# Patient Record
Sex: MALE | Race: WHITE | URBAN - METROPOLITAN AREA
[De-identification: names, ages, dates, MRNs, and addresses within clinical notes are randomized per-mention and may not be internally consistent; named-entity substitution may affect disease eponyms.]

---

## 2017-03-07 ENCOUNTER — MEDICAL CORRESPONDENCE (OUTPATIENT)
Dept: HEALTH INFORMATION MANAGEMENT | Facility: CLINIC | Age: 37
End: 2017-03-07

## 2017-03-10 ENCOUNTER — PRE VISIT (OUTPATIENT)
Dept: ORTHOPEDICS | Facility: CLINIC | Age: 37
End: 2017-03-10

## 2017-03-10 NOTE — TELEPHONE ENCOUNTER
Received call from Iris in Pathology @ [facility] - No pathology on file for patient.

## 2017-03-13 NOTE — TELEPHONE ENCOUNTER
Imaging [facility] from [facility]   Forwarded Radiology reports to [facility].     Images Include:   -CR 8/21/15 Knee  -12/8/14 X-ray R knee   -10/5/16 X-ray Knee Right   -8/21/15 Knee   -10/25/12 X-ray knee    -MR R Knee 12/27/16

## 2017-03-15 NOTE — TELEPHONE ENCOUNTER
Records received from [facility].   Included  Office notes: 2/6/17, 12/5/16, 8/10/16, 1/20/16, 11/4/15, 9/15/15    Radiology reports: MRI right knee on 12/7/16   Right knee on 10/5/16, 8/21/15 (left and right)  Op/Procedure notes: right knee arthroscopy on 12/10/15    Missing/needed records: right knee arthroscopy in 2004, has done injections and PT

## 2017-03-16 NOTE — TELEPHONE ENCOUNTER
Spoke with patient on the phone - stated his surgery in 2004 was done by Dr. Wilson Castellanos in [place]. Pt could not recall the name of the clinic/hospital. Stated the records/op notes from 2004 have been destroyed. Said he tried requesting the 2004 op note himself in the past and was told by Dr. Wilson Castellanos's office they only keep records up to 10 yrs.   2004 op note is no longer available.     Saw [facility] between [id number] before he went to the [facility]. Had imaging done.   No other records.     Mailed pt LAURE to sign for [facility].

## 2017-03-23 NOTE — TELEPHONE ENCOUNTER
Records received from [place].   Included  Office notes: 4/22/14, 4/15/14, 5/6/13  ED notes:  Radiology reports: bilateral knee on 4/15/14   MRI right LE joint on 4/15/14   Lower ext hip to ankle on 5/6/13   Right knee on 5/6/13

## 2017-04-13 DIAGNOSIS — M25.561 CHRONIC PAIN OF RIGHT KNEE: Primary | ICD-10-CM

## 2017-04-13 DIAGNOSIS — G89.29 CHRONIC PAIN OF RIGHT KNEE: Primary | ICD-10-CM

## 2017-05-09 ENCOUNTER — OFFICE VISIT (OUTPATIENT)
Dept: ORTHOPEDICS | Facility: CLINIC | Age: 37
End: 2017-05-09

## 2017-05-09 VITALS — WEIGHT: 214.7 LBS | HEIGHT: 68 IN | BODY MASS INDEX: 32.54 KG/M2

## 2017-05-09 DIAGNOSIS — M17.11 PATELLOFEMORAL ARTHRITIS OF RIGHT KNEE: Primary | ICD-10-CM

## 2017-05-09 ASSESSMENT — ACTIVITIES OF DAILY LIVING (ADL): FUNCTION,_DAILY_LIVING_SCORE: 32.35

## 2017-05-09 ASSESSMENT — KOOS JR
HOW SEVERE IS YOUR KNEE STIFFNESS AFTER FIRST WAKING IN MORNING: 1
HOW SEVERE IS YOUR KNEE STIFFNESS AFTER FIRST WAKING IN MORNING: EXTREME

## 2017-05-09 NOTE — LETTER
Verification of Appointment  May 9, 2017     Seen today: yes    Patient:  Matthew Ravi  :   1980  MRN:     [id number]  Physician: SHILPI MCDERMOTT    Matthew Ravi was seen in the [address] [place] [address] [place] [address] on May 9th 2017.                      Electronically signed by Shilpi Mcdermott MD/luis m

## 2017-05-09 NOTE — LETTER
"5/9/2017     RE: Matthew Ravi  [address] [facility] [address]     Dear Colleague,    Thank you for referring your patient, Matthew Ravi, to the [facility] at [facility]. Please see a copy of my visit note below.    CHIEF CONCERN:  Right anterior knee pain.      HISTORY OF PRESENT ILLNESS:  Mr. Ravi is a 36-year-old otherwise healthy male referred to Dr. Goldstein by Dr. Shekhar Lerma at the [facility] for right anterior knee pain for the past 13 years.  He describes an injury in 05/2004 during basic training for the [employer] where he twisted his knee and no injury was identified.  He had no patellofemoral dislocation but vague anterior knee pain symptoms limiting his participation in the .  He was medically discharged thereafter and in 10/2004 underwent arthroscopic debridement in [place].  He cannot recall the details or specific diagnosis at this time.      Following this, in 04/2006, he had another right knee surgery for tendons again for anterior knee pain but he cannot remember a specific procedure performed or the goals of the procedure.  In 09/2012, he had a tibial tubercle osteotomy in [place], [place], again for anterior knee pain symptoms.  He described this surgery as \"failed\" due to progressive knee pain symptoms and the alignment on the subsequent radiographs.      Finally, in 12/2015, he had a diagnostic arthroscopy at the [facility] by Dr. Lerma with a loose body removal.  The note from this procedure revealed lateral facet full-thickness grade III-IV cartilage degeneration with an intact trochlea and no evidence of degenerative disease in the medial or lateral compartments.      In clinic today, Mr. Ravi notes persistent and worsening anterior knee pain symptoms.  He is very frustrated with the 13 years of progressive knee pain symptoms and the lack of a clear diagnosis for his pain.  He has " previously undergone physical therapy near his home between August and 2016, but has not tried taping prior to his appointment today.  He has had a corticosteroid injection in  without significant benefit.  He presents today for further evaluation and discussion of available treatment options.      On a physical therapy visit simultaneous with today's clinic visit, there is noted decrease in anterior knee pain with taping.   Medial glide and tilt from 7/10-5/10 on step-up and step-down.  He has notably poor squat mechanics with the excessive anterior knee excursion, right worse than left.  He has notable core weakness associated with bridge positioning.       PAST MEDICAL HISTORY:  Reviewed with the patient's health intake form and with the patient in the clinic today and notable only for mental health problems which are stable at present.      PAST SURGICAL HISTORY:   None other than those outlined above.      MEDICATIONS:  No home medications.      ALLERGIES:  Erythromycin.      SOCIAL HISTORY:  Mr. 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  He does not smoke and does not drink alcohol.      FAMILY HISTORY:  Reviewed with the patient in the clinic today and negative for bleeding disorders, clotting disorders or difficulty with anesthesia.      PHYSICAL EXAMINATION:  Focused evaluation of right lower extremity reveals a well-healed anterior surgical incision and arthroscopic portals about the right knee.  There is no appreciable intra-articular effusion.  Passive knee range of motion is from full extension without an extensor lag to 115 degrees of knee flexion.  There is crepitation with active knee range of motion in the patellofemoral compartment.  Passive patellar excursion is 1+ quadrants laterally and less than 1 quadrant medially.  His lateral  patellar tilt is to neutral.  There is no J sign, no apprehension.  His hip range of motion is 30 degrees of internal rotation and 50 degrees of external rotation which is symmetric.  With his feet facing anterior, his patella face anteriorly without evidence of femoral anteversion.  There is no clinical evidence of hip flexion contracture bilaterally.      IMAGING:  Standing entire limb alignment radiographs demonstrate the weightbearing axis to fall through the center of the knee on the right.  Outside knee radiographs are reviewed and demonstrate 15 degrees of patellar tilt.  There is no evidence of a supratrochlear bump that C/D index measures 0.65.  Reviewing the outside MRI of the right knee demonstrates full thickness cartilage loss in the lateral patellar facet with intact cartilage of the medial and lateral compartments and trochlear groove.      The TT-TG distance is 13 mm.      ASSESSMENT:   1.  Lateral patellar facet cartilage degeneration.   2.  Lateral retinacular tightness.   3.  Poor squat mechanics and core weakness.      PLAN:  We had an extensive discussion with Mr. Ravi and his wife in clinic today with regards to his right anterior knee pain symptoms.  We discussed that he did demonstrate an improvement with his previous session with physical therapy today emphasizing squat mechanics and taping.  We would encourage them to return for formal physical therapy visit so that he may coordinate with a therapist closer to home to continue to emphasize core strengthening, squat mechanics and taping to concurrently improve his right anterior knee pain symptoms.      We reviewed his plain films and MRI scan which demonstrates a focal degeneration of the lateral patellar facet which clinically correlates with his lateral retinacular tightness on exam.  We discussed that he may benefit from a lateral retinacular release to offload this compartment but the timing is complicated by his wife's upcoming  delivery.      We would encourage them to review the videos of the therapy exercises discussed today and pursue a home exercise program, emphasizing strengthening.  If they return for this at the [facility], they may coordinate this with a formal physical therapy visit at the [facility].      We will forward the results of our evaluation at the [facility], Dr. Lerma so they may consider a lateral retinacular release when appropriate for the patient.  They are encouraged to contact our clinic with any questions, concerns or issues should they arise.      RT: 30 min, CT: 20 min.    I have personally examined this patient and have reviewed the clinical presentation and progress note with the resident.  I agree with the treatment plan as outlined.  The plan was formulated with the resident on the day of the resident dictation.    Shilpi Goldstein    Cc: Shekhar Barrios MD         Dept of orthopedic surgery         [facility], [place], [place].

## 2017-05-09 NOTE — PROGRESS NOTES
"CHIEF CONCERN:  Right anterior knee pain.      HISTORY OF PRESENT ILLNESS:  Mr. Ravi is a 36-year-old otherwise healthy male referred to Dr. Goldstein by Dr. Shekhar Lerma at the [facility] for right anterior knee pain for the past 13 years.  He describes an injury in 05/2004 during basic training for the [employer] where he twisted his knee and no injury was identified.  He had no patellofemoral dislocation but vague anterior knee pain symptoms limiting his participation in the .  He was medically discharged thereafter and in 10/2004 underwent arthroscopic debridement in [place].  He cannot recall the details or specific diagnosis at this time.      Following this, in 04/2006, he had another right knee surgery for tendons again for anterior knee pain but he cannot remember a specific procedure performed or the goals of the procedure.  In 09/2012, he had a tibial tubercle osteotomy in [place], [place], again for anterior knee pain symptoms.  He described this surgery as \"failed\" due to progressive knee pain symptoms and the alignment on the subsequent radiographs.      Finally, in 12/2015, he had a diagnostic arthroscopy at the [facility] by Dr. Lerma with a loose body removal.  The note from this procedure revealed lateral facet full-thickness grade III-IV cartilage degeneration with an intact trochlea and no evidence of degenerative disease in the medial or lateral compartments.      In clinic today, Mr. Ravi notes persistent and worsening anterior knee pain symptoms.  He is very frustrated with the 13 years of progressive knee pain symptoms and the lack of a clear diagnosis for his pain.  He has previously undergone physical therapy near his home between August and September 2016, but has not tried taping prior to his appointment today.  He has had a corticosteroid injection in 2012 without significant benefit.  He presents today for further evaluation and discussion of available " treatment options.      On a physical therapy visit simultaneous with today's clinic visit, there is noted decrease in anterior knee pain with taping.   Medial glide and tilt from 7/10-5/10 on step-up and step-down.  He has notably poor squat mechanics with the excessive anterior knee excursion, right worse than left.  He has notable core weakness associated with bridge positioning.       PAST MEDICAL HISTORY:  Reviewed with the patient's health intake form and with the patient in the clinic today and notable only for mental health problems which are stable at present.      PAST SURGICAL HISTORY:   None other than those outlined above.      MEDICATIONS:  No home medications.      ALLERGIES:  Erythromycin.      SOCIAL HISTORY:  Mr. 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  He does not smoke and does not drink alcohol.      FAMILY HISTORY:  Reviewed with the patient in the clinic today and negative for bleeding disorders, clotting disorders or difficulty with anesthesia.      PHYSICAL EXAMINATION:  Focused evaluation of right lower extremity reveals a well-healed anterior surgical incision and arthroscopic portals about the right knee.  There is no appreciable intra-articular effusion.  Passive knee range of motion is from full extension without an extensor lag to 115 degrees of knee flexion.  There is crepitation with active knee range of motion in the patellofemoral compartment.  Passive patellar excursion is 1+ quadrants laterally and less than 1 quadrant medially.  His lateral patellar tilt is to neutral.  There is no J sign, no apprehension.  His hip range of motion is 30 degrees of internal rotation and 50 degrees of external rotation which is symmetric.  With his feet facing anterior, his patella face anteriorly without evidence of femoral anteversion.  There is no  clinical evidence of hip flexion contracture bilaterally.      IMAGING:  Standing entire limb alignment radiographs demonstrate the weightbearing axis to fall through the center of the knee on the right.  Outside knee radiographs are reviewed and demonstrate 15 degrees of patellar tilt.  There is no evidence of a supratrochlear bump that C/D index measures 0.65.  Reviewing the outside MRI of the right knee demonstrates full thickness cartilage loss in the lateral patellar facet with intact cartilage of the medial and lateral compartments and trochlear groove.      The TT-TG distance is 13 mm.      ASSESSMENT:   1.  Lateral patellar facet cartilage degeneration.   2.  Lateral retinacular tightness.   3.  Poor squat mechanics and core weakness.      PLAN:  We had an extensive discussion with Mr. Ravi and his wife in clinic today with regards to his right anterior knee pain symptoms.  We discussed that he did demonstrate an improvement with his previous session with physical therapy today emphasizing squat mechanics and taping.  We would encourage them to return for formal physical therapy visit so that he may coordinate with a therapist closer to home to continue to emphasize core strengthening, squat mechanics and taping to concurrently improve his right anterior knee pain symptoms.      We reviewed his plain films and MRI scan which demonstrates a focal degeneration of the lateral patellar facet which clinically correlates with his lateral retinacular tightness on exam.  We discussed that he may benefit from a lateral retinacular release to offload this compartment but the timing is complicated by his wife's upcoming delivery.      We would encourage them to review the videos of the therapy exercises discussed today and pursue a home exercise program, emphasizing strengthening.  If they return for this at the [facility], they may coordinate this with a formal physical therapy visit at the [facility]  [place].      We will forward the results of our evaluation at the [facility], Dr. Lerma so they may consider a lateral retinacular release when appropriate for the patient.  They are encouraged to contact our clinic with any questions, concerns or issues should they arise.      RT: 30 min, CT: 20 min.    I have personally examined this patient and have reviewed the clinical presentation and progress note with the resident.  I agree with the treatment plan as outlined.  The plan was formulated with the resident on the day of the resident dictation.    Shilpi Goldstein    Cc: Shekhar Barrios MD         Dept of orthopedic surgery         [place], [place].

## 2017-05-09 NOTE — MR AVS SNAPSHOT
"              After Visit Summary   5/9/2017    Matthew Ravi    MRN: [id number]           Patient Information     Date Of Birth          1980        Visit Information        Provider Department      5/9/2017 8:30 AM Shilpi Goldstein MD [facility]        Today's Diagnoses     Patellofemoral arthritis of right knee    -  1       Follow-ups after your visit        Who to contact     Please call your clinic at [phone] to:    Ask questions about your health    Make or cancel appointments    Discuss your medicines    Learn about your test results    Speak to your doctor   If you have compliments or concerns about an experience at your clinic, or if you wish to file a complaint, please contact [facility] Patient Relations at [phone] or email us at [email or web address]         Additional Information About Your Visit        MyChart Information     [employer] gives you secure access to your electronic health record. If you see a primary care provider, you can also send messages to your care team and make appointments. If you have questions, please call your primary care clinic.  If you do not have a primary care provider, please call [phone] and they will assist you.      Rhapsody is an electronic gateway that provides easy, online access to your medical records. With Rhapsody, you can request a clinic appointment, read your test results, renew a prescription or communicate with your care team.     To access your existing account, please contact your [facility] or call [phone] for assistance.        Care EveryWhere ID     This is your Care EveryWhere ID. This could be used by other organizations to access your [facility] medical records  [id number]        Your Vitals Were     Height BMI (Body Mass Index)                1.727 m (5' 8\") 32.65 kg/m2           Blood Pressure from Last 3 Encounters:   04/07/13 " 139/75   03/08/13 132/80   10/23/12 123/63    Weight from Last 3 Encounters:   05/09/17 97.4 kg (214 lb 11.2 oz)   03/08/13 83.9 kg (184 lb 14.4 oz)   09/10/12 88.5 kg (195 lb)              Today, you had the following     No orders found for display       Primary Care Provider Office Phone # Fax #    Keon Danny Ac [phone]       [facility] [place] [address]  [place] [facility] [address]        Thank you!     Thank you for choosing [facility] ORTHOPAEDIC [facility]  for your care. Our goal is always to provide you with excellent care. Hearing back from our patients is one way we can continue to improve our services. Please take a few minutes to complete the written survey that you may receive in the mail after your visit with us. Thank you!             Your Updated Medication List - Protect others around you: Learn how to safely use, store and throw away your medicines at www.disposemymeds.org.      Notice  As of 5/9/2017 11:59 PM    You have not been prescribed any medications.

## 2017-05-09 NOTE — NURSING NOTE
"Reason For Visit:   Chief Complaint   Patient presents with     Consult     Right Knee cap instability/dislocation       ?  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  Currently working? Yes.  Work status?  Full time.  Date of injury: 05/2004  Type of injury: running  Date of surgery:   Type of surgery: 10/05/2000Left Knee scope, knee cap floating around,    10/2004 Right knee scope, 04/2006 repaired tendons and ligaments, 09/19/2012, tibial release? 2 screws were placed, 12/2015 scope  Smoker: No      Pain Assessment  Patient Currently in Pain: Yes  Patient's Stated Pain Goal: No pain  0-10 Pain Scale: 8  Primary Pain Location: Knee  Pain Orientation: Right  Pain Descriptors: Burning, Discomfort, Sharp, Shooting, Throbbing  Alleviating Factors: Ice, Rest, Other (comment) uses icy hot  Aggravating Factors: Movement, and non movement    Ht 1.727 m (5' 8\")  Wt 97.4 kg (214 lb 11.2 oz)  BMI 32.65 kg/m2     Allergies   Allergen Reactions     Erythromycin      Upset stomach     No current outpatient prescriptions on file.     No current facility-administered medications for this visit.                                                       "

## 2017-06-13 DIAGNOSIS — M17.11 PATELLOFEMORAL ARTHRITIS OF RIGHT KNEE: Primary | ICD-10-CM

## 2017-06-16 ENCOUNTER — TELEPHONE (OUTPATIENT)
Dept: ORTHOPEDICS | Facility: CLINIC | Age: 37
End: 2017-06-16

## 2017-06-16 NOTE — TELEPHONE ENCOUNTER
Writer tried calling patient multiple times this week to go through pre-op education for knee surgery planned for July with Dr. Goldstein.  Patient's phone number does not go through.  Pre-op packet filled out and sent to patient via mail.  This writer will try back in the next week or so regarding pre-op teaching.

## 2017-07-05 NOTE — TELEPHONE ENCOUNTER
Patient called back regarding pre-op instructions.  Patient received pre-op packet in the mail, this was reviewed with patient on the phone.  He understands he will need to have pre-op H&P completed prior to surgery.  He also knows that he should set up PT to start within one week of surgery.  He has our phone number for any further questions or concerns.

## 2017-07-05 NOTE — TELEPHONE ENCOUNTER
Left message for patient to call back to discuss pre-op teaching for knee surgery planned on 7/28/2017.

## 2017-07-26 RX ORDER — MECLIZINE HCL 25MG 25 MG/1
25 TABLET, CHEWABLE ORAL 2 TIMES DAILY PRN
COMMUNITY

## 2017-07-26 RX ORDER — TRIAMCINOLONE ACETONIDE 1 MG/G
CREAM TOPICAL 2 TIMES DAILY PRN
COMMUNITY

## 2017-07-26 RX ORDER — FLUTICASONE PROPIONATE 50 MCG
2 SPRAY, SUSPENSION (ML) NASAL DAILY
COMMUNITY

## 2017-07-26 RX ORDER — FLUOCINONIDE 0.5 MG/G
CREAM TOPICAL 2 TIMES DAILY
COMMUNITY

## 2017-07-27 ENCOUNTER — ANESTHESIA EVENT (OUTPATIENT)
Dept: SURGERY | Facility: AMBULATORY SURGERY CENTER | Age: 37
End: 2017-07-27

## 2017-07-28 ENCOUNTER — ANESTHESIA (OUTPATIENT)
Dept: SURGERY | Facility: AMBULATORY SURGERY CENTER | Age: 37
End: 2017-07-28

## 2017-07-28 ENCOUNTER — HOSPITAL ENCOUNTER (OUTPATIENT)
Facility: AMBULATORY SURGERY CENTER | Age: 37
End: 2017-07-28
Attending: ORTHOPAEDIC SURGERY

## 2017-07-28 ENCOUNTER — SURGERY (OUTPATIENT)
Age: 37
End: 2017-07-28

## 2017-07-28 VITALS
TEMPERATURE: 97 F | SYSTOLIC BLOOD PRESSURE: 117 MMHG | OXYGEN SATURATION: 96 % | RESPIRATION RATE: 16 BRPM | DIASTOLIC BLOOD PRESSURE: 77 MMHG

## 2017-07-28 DIAGNOSIS — M25.369 PATELLOFEMORAL INSTABILITY WITH PAIN, UNSPECIFIED LATERALITY: Primary | ICD-10-CM

## 2017-07-28 DIAGNOSIS — M25.569 PATELLOFEMORAL INSTABILITY WITH PAIN, UNSPECIFIED LATERALITY: Primary | ICD-10-CM

## 2017-07-28 RX ORDER — MEPERIDINE HYDROCHLORIDE 25 MG/ML
12.5 INJECTION INTRAMUSCULAR; INTRAVENOUS; SUBCUTANEOUS
Status: DISCONTINUED | OUTPATIENT
Start: 2017-07-28 | End: 2017-07-29 | Stop reason: HOSPADM

## 2017-07-28 RX ORDER — FENTANYL CITRATE 50 UG/ML
25-50 INJECTION, SOLUTION INTRAMUSCULAR; INTRAVENOUS
Status: DISCONTINUED | OUTPATIENT
Start: 2017-07-28 | End: 2017-07-28 | Stop reason: HOSPADM

## 2017-07-28 RX ORDER — KETOROLAC TROMETHAMINE 30 MG/ML
INJECTION, SOLUTION INTRAMUSCULAR; INTRAVENOUS PRN
Status: DISCONTINUED | OUTPATIENT
Start: 2017-07-28 | End: 2017-07-28

## 2017-07-28 RX ORDER — DEXAMETHASONE SODIUM PHOSPHATE 4 MG/ML
INJECTION, SOLUTION INTRA-ARTICULAR; INTRALESIONAL; INTRAMUSCULAR; INTRAVENOUS; SOFT TISSUE PRN
Status: DISCONTINUED | OUTPATIENT
Start: 2017-07-28 | End: 2017-07-28

## 2017-07-28 RX ORDER — OXYCODONE HYDROCHLORIDE 5 MG/1
10 TABLET ORAL ONCE
Status: COMPLETED | OUTPATIENT
Start: 2017-07-28 | End: 2017-07-28

## 2017-07-28 RX ORDER — SODIUM CHLORIDE, SODIUM LACTATE, POTASSIUM CHLORIDE, CALCIUM CHLORIDE 600; 310; 30; 20 MG/100ML; MG/100ML; MG/100ML; MG/100ML
INJECTION, SOLUTION INTRAVENOUS CONTINUOUS
Status: DISCONTINUED | OUTPATIENT
Start: 2017-07-28 | End: 2017-07-29 | Stop reason: HOSPADM

## 2017-07-28 RX ORDER — ONDANSETRON 4 MG/1
4 TABLET, ORALLY DISINTEGRATING ORAL EVERY 30 MIN PRN
Status: DISCONTINUED | OUTPATIENT
Start: 2017-07-28 | End: 2017-07-29 | Stop reason: HOSPADM

## 2017-07-28 RX ORDER — ACETAMINOPHEN 325 MG/1
975 TABLET ORAL ONCE
Status: COMPLETED | OUTPATIENT
Start: 2017-07-28 | End: 2017-07-28

## 2017-07-28 RX ORDER — NALOXONE HYDROCHLORIDE 0.4 MG/ML
.1-.4 INJECTION, SOLUTION INTRAMUSCULAR; INTRAVENOUS; SUBCUTANEOUS
Status: DISCONTINUED | OUTPATIENT
Start: 2017-07-28 | End: 2017-07-29 | Stop reason: HOSPADM

## 2017-07-28 RX ORDER — ACETAMINOPHEN 325 MG/1
650 TABLET ORAL EVERY 4 HOURS PRN
Qty: 100 TABLET | Refills: 0 | Status: SHIPPED | OUTPATIENT
Start: 2017-07-28

## 2017-07-28 RX ORDER — LIDOCAINE HYDROCHLORIDE 20 MG/ML
INJECTION, SOLUTION INFILTRATION; PERINEURAL PRN
Status: DISCONTINUED | OUTPATIENT
Start: 2017-07-28 | End: 2017-07-28

## 2017-07-28 RX ORDER — AMOXICILLIN 250 MG
1-2 CAPSULE ORAL 2 TIMES DAILY
Qty: 30 TABLET | Refills: 0 | Status: SHIPPED | OUTPATIENT
Start: 2017-07-28 | End: 2017-08-11

## 2017-07-28 RX ORDER — PROPOFOL 10 MG/ML
INJECTION, EMULSION INTRAVENOUS CONTINUOUS PRN
Status: DISCONTINUED | OUTPATIENT
Start: 2017-07-28 | End: 2017-07-28

## 2017-07-28 RX ORDER — KETOROLAC TROMETHAMINE 30 MG/ML
30 INJECTION, SOLUTION INTRAMUSCULAR; INTRAVENOUS EVERY 6 HOURS PRN
Status: DISCONTINUED | OUTPATIENT
Start: 2017-07-28 | End: 2017-07-29 | Stop reason: HOSPADM

## 2017-07-28 RX ORDER — SODIUM CHLORIDE, SODIUM LACTATE, POTASSIUM CHLORIDE, CALCIUM CHLORIDE 600; 310; 30; 20 MG/100ML; MG/100ML; MG/100ML; MG/100ML
INJECTION, SOLUTION INTRAVENOUS CONTINUOUS PRN
Status: DISCONTINUED | OUTPATIENT
Start: 2017-07-28 | End: 2017-07-28

## 2017-07-28 RX ORDER — PROPOFOL 10 MG/ML
INJECTION, EMULSION INTRAVENOUS PRN
Status: DISCONTINUED | OUTPATIENT
Start: 2017-07-28 | End: 2017-07-28

## 2017-07-28 RX ORDER — FENTANYL CITRATE 50 UG/ML
INJECTION, SOLUTION INTRAMUSCULAR; INTRAVENOUS PRN
Status: DISCONTINUED | OUTPATIENT
Start: 2017-07-28 | End: 2017-07-28

## 2017-07-28 RX ORDER — GABAPENTIN 300 MG/1
300 CAPSULE ORAL ONCE
Status: COMPLETED | OUTPATIENT
Start: 2017-07-28 | End: 2017-07-28

## 2017-07-28 RX ORDER — OXYCODONE HYDROCHLORIDE 5 MG/1
5-10 TABLET ORAL
Qty: 30 TABLET | Refills: 0 | Status: SHIPPED | OUTPATIENT
Start: 2017-07-28 | End: 2017-08-11

## 2017-07-28 RX ORDER — ONDANSETRON 2 MG/ML
INJECTION INTRAMUSCULAR; INTRAVENOUS PRN
Status: DISCONTINUED | OUTPATIENT
Start: 2017-07-28 | End: 2017-07-28

## 2017-07-28 RX ORDER — CEFAZOLIN SODIUM 1 G/3ML
1 INJECTION, POWDER, FOR SOLUTION INTRAMUSCULAR; INTRAVENOUS SEE ADMIN INSTRUCTIONS
Status: DISCONTINUED | OUTPATIENT
Start: 2017-07-28 | End: 2017-07-28 | Stop reason: HOSPADM

## 2017-07-28 RX ORDER — ONDANSETRON 2 MG/ML
4 INJECTION INTRAMUSCULAR; INTRAVENOUS EVERY 30 MIN PRN
Status: DISCONTINUED | OUTPATIENT
Start: 2017-07-28 | End: 2017-07-29 | Stop reason: HOSPADM

## 2017-07-28 RX ADMIN — ACETAMINOPHEN 975 MG: 325 TABLET ORAL at 12:30

## 2017-07-28 RX ADMIN — DEXAMETHASONE SODIUM PHOSPHATE 4 MG: 4 INJECTION, SOLUTION INTRA-ARTICULAR; INTRALESIONAL; INTRAMUSCULAR; INTRAVENOUS; SOFT TISSUE at 14:10

## 2017-07-28 RX ADMIN — FENTANYL CITRATE 50 MCG: 50 INJECTION, SOLUTION INTRAMUSCULAR; INTRAVENOUS at 13:56

## 2017-07-28 RX ADMIN — PROPOFOL 200 MCG/KG/MIN: 10 INJECTION, EMULSION INTRAVENOUS at 13:58

## 2017-07-28 RX ADMIN — KETOROLAC TROMETHAMINE 30 MG: 30 INJECTION, SOLUTION INTRAMUSCULAR; INTRAVENOUS at 15:04

## 2017-07-28 RX ADMIN — SODIUM CHLORIDE, SODIUM LACTATE, POTASSIUM CHLORIDE, CALCIUM CHLORIDE: 600; 310; 30; 20 INJECTION, SOLUTION INTRAVENOUS at 13:47

## 2017-07-28 RX ADMIN — PROPOFOL 200 MG: 10 INJECTION, EMULSION INTRAVENOUS at 13:56

## 2017-07-28 RX ADMIN — LIDOCAINE HYDROCHLORIDE 100 MG: 20 INJECTION, SOLUTION INFILTRATION; PERINEURAL at 13:56

## 2017-07-28 RX ADMIN — GABAPENTIN 300 MG: 300 CAPSULE ORAL at 12:30

## 2017-07-28 RX ADMIN — OXYCODONE HYDROCHLORIDE 10 MG: 5 TABLET ORAL at 16:02

## 2017-07-28 RX ADMIN — ONDANSETRON 4 MG: 2 INJECTION INTRAMUSCULAR; INTRAVENOUS at 14:10

## 2017-07-28 RX ADMIN — PROPOFOL: 10 INJECTION, EMULSION INTRAVENOUS at 14:17

## 2017-07-28 NOTE — BRIEF OP NOTE
Orthopaedic Surgery Brief Op-Note     Patient: Matthew Raiv  : 1980  Date of Service: 2017 3:28 PM    Preoperative Diagnosis: Patellofemoral Arthritis     Postoperative Diagnosis: same    Procedure: Procedure(s):  Right Knee Arthroscopy Plus Lateral Retinacular Lengthening and Anterior Chamber Release - Wound Class: I-Clean    Surgeon: Dr. Goldstein    Assistants:  Heather Navarro MD    Anesthesia: General     Estimated Blood Loss: 30 cc        Specimen: none       Complications: none    Condition: stable    Findings: please see dictated operative note    Plan:    WBAT with hinged knee brace on and locked at 10 degrees; assistive devices as needed    Hinged knee brace is to be set at 10 degrees to 90 degrees; lock at 10 degrees flexion while ambulating; the brace is to be worn at all times except with range of motion exercises and CPM use    CPM to be set at 0 degrees to flexion tolerance with goal of 90 degrees; advance aggressively in 5 degree increments as tolerated by the patient, emphasize full extension about the knee    PT as outpatient; an order and PT protocol will be provided to the patient at time of discharge    ADAT    Pain control with orals prn    Bowel prophylaxis with senna-docusate    DVT prophylaxis: mechanical only     Future Appointments  Date Time Provider Department Center   2017 11:15 AM Shilpi Goldstein MD [facility]       Disposition: patient may be discharged with  once appropriate discharge criteria have been met    Heather Navarro MD  PGY-2  Orthopaedic Surgery   [phone]

## 2017-07-28 NOTE — LETTER
Matthew Ravi  [address] [facility] [address]      July 28, 2017    To Whom It May Concern:     Sarahi Richards underwent surgery by me at the [facility] and [facility].  I would ask for your help in taking care of his/her wound postop.       The surgical procedure was: Lateral retinacular lengthening, anterior chamber release and arthroscopy of  Right knee     The incisions were closed with a buried absorbable running stitch,Dermabond Skin Closure System  was applied overtop, and then finally covered with steri-strips. There is a single suture in a portal site that is not buried.      The Dermabond is a strong adhesive that allows for a water-tight wound closer. Typically, the Dermabond remains in place until postoperative day 14, and will slowly dissolve over time. The steri-strips were placed over the Dermabond for reinforcement.      If you are checking the wound before postoperative day 14, trim any free edges of the Dermabond that are no longer adhered to the skin surface. To do this, grasp the free edge, apply gentle tension, and trim with a scissors. Inform the patient that the remaining Dermabond will eventually come off of the skin surface with bathing. Allow the steri-strips to fall-off on their own. Once they have fallen off, they no longer need to be replaced so long as the incision remains clean, dry, and intact. Between day 10-14 the single non-buried suture in the portal site will need to be removed. The ends of the buried absorbable running stitch can also be trimmed at the skin at this time time.      Once you have performed the general incision cares outlined above, please apply a fresh dressing to the wound using 4x4s and tape.      Should there be any concerns with the incision, please feel free to call our triage line at [phone], option #3, and ask to speak with one of the triage nurses.       Thanks in advance in helping me out in the postoperative  care of this patient.       Sincerely,

## 2017-07-28 NOTE — DISCHARGE INSTRUCTIONS
[facility]  Home Care Following Anesthesia  For 24 hours after surgery:  1. Get plenty of rest.  A responsible adult must stay with you for at least 24 hours after you leave the surgery center.  2. Do not drive or use heavy equipment.  If you have weakness or tingling, don't drive or use heavy equipment until this feeling goes away.   3. Do not drink alcohol.   4. Avoid strenuous or risky activities.  Ask for help when climbing stairs.  5. You may feel lightheaded.  IF so, sit for a few minutes before standing.  Have someone help you get up.   6. If you have nausea (feel sick to your stomach): Drink only clear liquids such as apple juice, ginger ale, broth or 7-Up.  Rest may also help.  Be sure to drink enough fluids.  Move to a regular diet as you feel able.   7. You may have a slight fever.  Call the doctor if your fever is over 100 F (37.7 C) (taken under the tongue) or lasts longer than 24 hours.  8. You may have a dry mouth, a sore throat, muscle aches or trouble sleeping. These should go away after 24 hours.  9. Do not make important or legal decisions.     Tips for taking pain medications  To get the best pain relief possible, remember these points:    Take pain medications as directed, before pain becomes severe.    Pain medication can upset your stomach: taking it with food may help.    Constipation is a common side effect of pain medication. Drink plenty of  fluids.    Eat foods high in fiber. Take a stool softener if recommended by your doctor or pharmacist.    Do not drink alcohol, drive or operate machinery while taking pain medications.    Ask about other ways to control pain, such as with heat, ice or relaxation.    Tylenol/Acetaminophen Consumption  To help encourage the safe use of acetaminophen, the makers of TYLENOL  have lowered the maximum daily dose for single-ingredient Extra Strength TYLENOL  (acetaminophen) products sold in the [place] from 8 pills per day  (4,000 mg) to 6 pills per day (3,000 mg). The dosing interval has also changed from 2 pills every 4-6 hours to 2 pills every 6 hours.    If you feel your pain relief is insufficient, you may take Tylenol/Acetaminophen in addition to your narcotic pain medication.     Be careful not to exceed 3,000 mg of Tylenol/Acetaminophen in a 24 hour period from all sources.    If you are taking extra strength Tylenol/acetaminophen (500 mg), the maximum dose is 6 tablets in 24 hours.    If you are taking regular strength acetaminophen (325 mg), the maximum dose is 9 tablets in 24 hours.    Call a doctor for any of the followin. Signs of infection (fever, growing tenderness at the surgery site, a large amount of drainage or bleeding, severe pain, foul-smelling drainage, redness, swelling).  2. It has been over 8 to 10 hours since surgery and you are still not able to urinate (pass water).  3. Headache for over 24 hours.    Your doctor is:  Dr. Shilpi Goldstein, Orthopaedics: [phone]           Or dial [phone] and ask for the resident on call for:  Orthopaedics  For emergency care, call the:  [facility] Emergency Department: [phone] (TTY for hearing impaired: [phone])

## 2017-07-28 NOTE — ANESTHESIA CARE TRANSFER NOTE
Patient: Matthew Ravi    Procedure(s):  Right Knee Arthroscopy Plus Lateral Retinacular Lengthening and Anterior Chamber Release - Wound Class: I-Clean    Diagnosis: Patellofemoral Arthritis  Diagnosis Additional Information: No value filed.    Anesthesia Type:   General, LMA     Note:  Airway :Face Mask  Patient transferred to:PACU  Comments: Arrive PACU, Stable, Airway Intact  105/91, 85,12,93%  All questions answered.        Vitals: (Last set prior to Anesthesia Care Transfer)    CRNA VITALS  7/28/2017 1457 - 7/28/2017 1531      7/28/2017             Pulse: 74    SpO2: 98 %    Resp Rate (observed): 12                Electronically Signed By: JUS Power CRNA  July 28, 2017  3:31 PM

## 2017-07-28 NOTE — IP AVS SNAPSHOT
MRN:[id number]                      After Visit Summary   7/28/2017    Matthew Ravi    MRN: [id number]           Thank you!     Thank you for choosing [facility] for your care. Our goal is always to provide you with excellent care. Hearing back from our patients is one way we can continue to improve our services. Please take a few minutes to complete the written survey that you may receive in the mail after you visit with us. Thank you!        Patient Information     Date Of Birth          1980        About your hospital stay     You were admitted on:  July 28, 2017 [facility]    You were discharged on:  July 28, 2017       Who to Call     For medical emergencies, please call 911.  For non-urgent questions about your medical care, please call your primary care provider or clinic, [phone]  For questions related to your surgery, please call your surgery clinic        Attending Provider     Provider Shilpi Euceda MD Orthopedics       Primary Care Provider Office Phone # Fax #    Keon Danny Ac [phone]      After Care Instructions     Discharge Instructions       KNEE ARTHROSCOPY POST OPERATIVE INSTRUCTIONS    A.  COMFORT:  -Elevation: Elevate your knee and ankle above the level of your heart. The best position is lying down with two pillows lengthwise under your entire leg. This should be done for the first several days after surgery.  -Swelling: An ice pack will be provided to control swelling and discomfort.  Once the initial dressing is removed, place a thin towel between your skin and the ice pack.  -Pain Medication: Take medication as prescribed, but only as often as necessary. Avoid alcohol and driving if you are taking pain medication.  Remember that Tylenol can be used for pain relief as you wean off the narcotics. The maximum Tylenol dose for a single day is 3000 mg.           B.  ACTIVITIES:  -Exercises: Point and flex your feet and wiggle your toes, move your ankles clockwise and counterclockwise in circular motions, to help prevent complications such as blood clotting in your legs. Thigh muscle tightening exercises should begin the day of surgery - Tighten quadriceps muscles on top of thigh so the kneecap is pulled up and leg is completely straight. Hold to a count of five. Relax 1-2 seconds between each exercise. Do ten times each hour.  -Weight bearing status: Use crutches as needed for the first two to three days to avoid pain when walking. You may weight bear on your leg as tolerated.  WBAT with hinged knee brace on and locked at 10 degrees; assistive devices as needed  Hinged knee brace is to be set at 10 degrees to 90 degrees; lock at 10 degrees flexion while ambulating; the brace is to be worn at all times except with range of motion exercises and CPM use  CPM to be set at 0 degrees to flexion tolerance with goal of 90 degrees; advance aggressively in 5 degree increments as tolerated by the patient, emphasize full extension about the knee    C. INCISION CARE:    -Keep the initial post-op dressing on, clean and dry for 3 days after surgery. You may then remove the dressing and shower. If the dressing feels too tight or you are experiencing numbness swelling, or tingling below the dressing, remove the outer dressing.  -The steri-strips (small white tape that is directly on the incision areas) should be left on until they fall off or are removed at your first office visit. If the wound is not healing, new steri-strips should be applied. If a little drainage from the wound occurs after you have removed the original dressing, use a band-aid. If the drainage increases, CALL YOUR DOCTOR.  Redress the knee with tubi- or ace wrap as long as swelling persists.   -Avoid soaking the wound in a pool or bath for TEN days (or until the skin has healed over the surgical area). When  taking a shower, let water run over the wound, pat dry. DO NOT rub or scrub the wound area. The wound itself can be cleaned with rubbing alcohol or peroxide. You may also use rubbing alcohol to remove betadine or other skin prep solution from the leg.    D. CALL YOUR PHYSICIAN IF:  -Pain in your knee persists or worsens in the first few days after surgery, or is not relieved by Tylenol or the narcotic prescribed to you.  -You have excessive redness or drainage of cloudy or bloody material from the wounds (Clear red tinted fluid and some mild drainage should be expected). Drainage of any kind 5 days after surgery should be reported to the doctor.  -You have signs of infection such as a temperature elevation greater than 101.5 , increased tenderness, swelling or redness at surgical site.  -You have pain, swelling or redness in your calf that increases over time.  -You have numbness or weakness in your leg or foot.  -You injure your leg or knee.      You may contact your physician at [phone] during business hours.    For after hours or weekend calls, you may contact the hospital at [phone] and ask for the on-call orthopedic resident            Ice to affected area       Ice pack to surgical site every 15 minutes per hour for 24 hours            No Alcohol       While on narcotic medication            No driving or operating machinery       Do not drive or operate machinery until narcotic pain medications are discontinued            Shower       You may remove the dressing and shower on POD #3            Weight bearing - As tolerated       WBAT with hinged knee brace on and locked at 10 degrees; assistive devices as needed  Hinged knee brace is to be set at 10 degrees to 90 degrees; lock at 10 degrees flexion while ambulating; the brace is to be worn at all times except with range of motion exercises and CPM use  CPM to be set at 0 degrees to flexion tolerance with goal of 90 degrees; advance aggressively  in 5 degree increments as tolerated by the patient, emphasize full extension about the knee                  Your next 10 appointments already scheduled     Aug 29, 2017 11:15 AM CDT   (Arrive by 11:00 AM)   RETURN KNEE with Shilpi Goldstein MD   [facility] ([facility])    [address] [place] [address] [phone]              Further instructions from your care team       [facility]  Home Care Following Anesthesia  For 24 hours after surgery:  1. Get plenty of rest.  A responsible adult must stay with you for at least 24 hours after you leave the surgery center.  2. Do not drive or use heavy equipment.  If you have weakness or tingling, don't drive or use heavy equipment until this feeling goes away.   3. Do not drink alcohol.   4. Avoid strenuous or risky activities.  Ask for help when climbing stairs.  5. You may feel lightheaded.  IF so, sit for a few minutes before standing.  Have someone help you get up.   6. If you have nausea (feel sick to your stomach): Drink only clear liquids such as apple juice, ginger ale, broth or 7-Up.  Rest may also help.  Be sure to drink enough fluids.  Move to a regular diet as you feel able.   7. You may have a slight fever.  Call the doctor if your fever is over 100 F (37.7 C) (taken under the tongue) or lasts longer than 24 hours.  8. You may have a dry mouth, a sore throat, muscle aches or trouble sleeping. These should go away after 24 hours.  9. Do not make important or legal decisions.     Tips for taking pain medications  To get the best pain relief possible, remember these points:    Take pain medications as directed, before pain becomes severe.    Pain medication can upset your stomach: taking it with food may help.    Constipation is a common side effect of pain medication. Drink plenty of  fluids.    Eat foods high in fiber. Take a stool softener if  recommended by your doctor or pharmacist.    Do not drink alcohol, drive or operate machinery while taking pain medications.    Ask about other ways to control pain, such as with heat, ice or relaxation.    Tylenol/Acetaminophen Consumption  To help encourage the safe use of acetaminophen, the makers of TYLENOL  have lowered the maximum daily dose for single-ingredient Extra Strength TYLENOL  (acetaminophen) products sold in the [place] from 8 pills per day (4,000 mg) to 6 pills per day (3,000 mg). The dosing interval has also changed from 2 pills every 4-6 hours to 2 pills every 6 hours.    If you feel your pain relief is insufficient, you may take Tylenol/Acetaminophen in addition to your narcotic pain medication.     Be careful not to exceed 3,000 mg of Tylenol/Acetaminophen in a 24 hour period from all sources.    If you are taking extra strength Tylenol/acetaminophen (500 mg), the maximum dose is 6 tablets in 24 hours.    If you are taking regular strength acetaminophen (325 mg), the maximum dose is 9 tablets in 24 hours.    Call a doctor for any of the followin. Signs of infection (fever, growing tenderness at the surgery site, a large amount of drainage or bleeding, severe pain, foul-smelling drainage, redness, swelling).  2. It has been over 8 to 10 hours since surgery and you are still not able to urinate (pass water).  3. Headache for over 24 hours.    Your doctor is:  Dr. Shilpi Goldstein, Orthopaedics: [phone]           Or dial [phone] and ask for the resident on call for:  Orthopaedics  For emergency care, call the:  [facility] Emergency Department: [phone] (TTY for hearing impaired: [phone])                Pending Results     No orders found from 2017 to 2017.            Admission Information     Date & Time Provider Department Dept. Phone    2017 Shilpi Goldstein MD [facility] [phone]      Your Vitals Were     Blood Pressure  Temperature Respirations Pulse Oximetry          142/78 97  F (36.1  C) (Temporal) 20 97%        [employer] Information     [employer] gives you secure access to your electronic health record. If you see a primary care provider, you can also send messages to your care team and make appointments. If you have questions, please call your primary care clinic.  If you do not have a primary care provider, please call [phone] and they will assist you.      [employer] is an electronic gateway that provides easy, online access to your medical records. With [employer], you can request a clinic appointment, read your test results, renew a prescription or communicate with your care team.     To access your existing account, please contact your [facility] or call [phone] for assistance.        Care EveryWhere ID     This is your Care EveryWhere ID. This could be used by other organizations to access your [facility] medical records  [id number]        Equal Access to Services     ADEN DAVIS : Charla Nichols, ny ivey, bina chua, allen barney . So [facility] [phone].    ATENCIÓN: Si habla español, tiene a aguiar disposición servicios gratuitos de asistencia lingüística. Llivonne al [phone].    We comply with applicable federal civil rights laws and [place] laws. We do not discriminate on the basis of race, color, national origin, age, disability sex, sexual orientation or gender identity.               Review of your medicines      START taking        Dose / Directions    acetaminophen 325 MG tablet   Commonly known as:  TYLENOL   Used for:  Patellofemoral instability with pain, unspecified laterality        Dose:  650 mg   Take 2 tablets (650 mg) by mouth every 4 hours as needed for other (mild pain)   Quantity:  100 tablet   Refills:  0       oxyCODONE 5 MG IR tablet   Commonly known as:  ROXICODONE   Used for:  Patellofemoral  instability with pain, unspecified laterality        Dose:  5-10 mg   Take 1-2 tablets (5-10 mg) by mouth every 3 hours as needed for pain or other (Moderate to Severe)   Quantity:  30 tablet   Refills:  0       senna-docusate 8.6-50 MG per tablet   Commonly known as:  SENOKOT-S;PERICOLACE   Used for:  Patellofemoral instability with pain, unspecified laterality        Dose:  1-2 tablet   Take 1-2 tablets by mouth 2 times daily Take while on oral narcotics to prevent or treat constipation.   Quantity:  30 tablet   Refills:  0         CONTINUE these medicines which have NOT CHANGED        Dose / Directions    ATORVASTATIN CALCIUM PO        Dose:  10 mg   Take 10 mg by mouth every morning   Refills:  0       camphor-menthol 0.5-0.5 % Lotn   Commonly known as:  DERMASARRA        Apply topically daily as needed for skin care   Refills:  0       FEXOFENADINE HCL PO        Dose:  60 mg   Take 60 mg by mouth daily as needed for allergies   Refills:  0       fluocinonide 0.05 % cream   Commonly known as:  LIDEX        Apply topically 2 times daily   Refills:  0       fluticasone 50 MCG/ACT spray   Commonly known as:  FLONASE        Dose:  2 spray   Spray 2 sprays into both nostrils daily   Refills:  0       HYDROXYZINE PAMOATE PO        Dose:  25 mg   Take 25 mg by mouth every 6 hours as needed for itching   Refills:  0       meclizine 25 MG Chew        Dose:  25 mg   Take 25 mg by mouth 2 times daily as needed for dizziness (1/2 tablet)   Refills:  0       triamcinolone 0.1 % cream   Commonly known as:  KENALOG        Apply topically 2 times daily as needed for irritation   Refills:  0            Where to get your medicines      These medications were sent to [place], [place] - [address]  [address] [place] [address] [address], [facility] [address]    Hours:  TRANSPLANT PHONE NUMBER [phone] Phone:  [phone]     acetaminophen 325 MG tablet    senna-docusate 8.6-50 MG per  tablet         Some of these will need a paper prescription and others can be bought over the counter. Ask your nurse if you have questions.     Bring a paper prescription for each of these medications     oxyCODONE 5 MG IR tablet                Protect others around you: Learn how to safely use, store and throw away your medicines at www.disposemymeds.org.             Medication List: This is a list of all your medications and when to take them. Check marks below indicate your daily home schedule. Keep this list as a reference.      Medications           Morning Afternoon Evening Bedtime As Needed    acetaminophen 325 MG tablet   Commonly known as:  TYLENOL   Take 2 tablets (650 mg) by mouth every 4 hours as needed for other (mild pain)   Last time this was given:  975 mg on 7/28/2017 12:30 PM                                ATORVASTATIN CALCIUM PO   Take 10 mg by mouth every morning                                camphor-menthol 0.5-0.5 % Lotn   Commonly known as:  DERMASARRA   Apply topically daily as needed for skin care                                FEXOFENADINE HCL PO   Take 60 mg by mouth daily as needed for allergies                                fluocinonide 0.05 % cream   Commonly known as:  LIDEX   Apply topically 2 times daily                                fluticasone 50 MCG/ACT spray   Commonly known as:  FLONASE   Spray 2 sprays into both nostrils daily                                HYDROXYZINE PAMOATE PO   Take 25 mg by mouth every 6 hours as needed for itching                                meclizine 25 MG Chew   Take 25 mg by mouth 2 times daily as needed for dizziness (1/2 tablet)                                oxyCODONE 5 MG IR tablet   Commonly known as:  ROXICODONE   Take 1-2 tablets (5-10 mg) by mouth every 3 hours as needed for pain or other (Moderate to Severe)                                senna-docusate 8.6-50 MG per tablet   Commonly known as:  SENOKOT-S;PERICOLACE   Take 1-2 tablets  by mouth 2 times daily Take while on oral narcotics to prevent or treat constipation.                                triamcinolone 0.1 % cream   Commonly known as:  KENALOG   Apply topically 2 times daily as needed for irritation

## 2017-07-28 NOTE — ANESTHESIA PREPROCEDURE EVALUATION
Anesthesia Evaluation     . Pt has had prior anesthetic. Type: General    No history of anesthetic complications          ROS/MED HX    ENT/Pulmonary:  - neg pulmonary ROS     Neurologic:  - neg neurologic ROS     Cardiovascular:  - neg cardiovascular ROS       METS/Exercise Tolerance:  >4 METS   Hematologic:  - neg hematologic  ROS       Musculoskeletal:  - neg musculoskeletal ROS       GI/Hepatic:  - neg GI/hepatic ROS       Renal/Genitourinary:  - ROS Renal section negative       Endo:  - neg endo ROS       Psychiatric:  - neg psychiatric ROS       Infectious Disease:  - neg infectious disease ROS       Malignancy:         Other:                     Physical Exam  Normal systems: dental    Airway   Mallampati: I  TM distance: >3 FB  Neck ROM: full    Dental     Cardiovascular   Rhythm and rate: regular and normal      Pulmonary    breath sounds clear to auscultation                    Anesthesia Plan      History & Physical Review  History and physical reviewed and following examination; no interval change.    ASA Status:  1 .    NPO Status:  > 6 hours    Plan for General and LMA with Intravenous induction. Maintenance will be TIVA.    PONV prophylaxis:  Ondansetron (or other 5HT-3) and Dexamethasone or Solumedrol       Postoperative Care  Postoperative pain management:  Oral pain medications and Multi-modal analgesia.      Consents  Anesthetic plan, risks, benefits and alternatives discussed with:  Patient..                          .

## 2017-07-28 NOTE — IP AVS SNAPSHOT
[facility]    [address] [place] [id number]    Phone:  [phone]    Fax:  [phone]                                       After Visit Summary   7/28/2017    Matthew Ravi    MRN: [id number]           After Visit Summary Signature Page     I have received my discharge instructions, and my questions have been answered. I have discussed any challenges I see with this plan with the nurse or doctor.    ..........................................................................................................................................  Patient/Patient Representative Signature      ..........................................................................................................................................  Patient Representative Print Name and Relationship to Patient    ..................................................               ................................................  Date                                            Time    ..........................................................................................................................................  Reviewed by Signature/Title    ...................................................              ..............................................  Date                                                            Time

## 2017-07-28 NOTE — LETTER
Matthew Ravi  [address] [place] [address]           Matthew Ravi  [address] [place] [address]      July 28, 2017    To Whom It May Concern:     Matthew Ravi underwent surgery by me at the [facility].  I would ask for your help in taking care of his/her wound postop.       The surgical procedure was: Lateral retinacular lengthening, anterior chamber release and arthroscopy of  Right knee     The incisions were closed with a buried absorbable running stitch,Dermabond Skin Closure System  was applied overtop, and then finally covered with steri-strips. There is a single suture in a portal site that is not buried.      The Dermabond is a strong adhesive that allows for a water-tight wound closer. Typically, the Dermabond remains in place until postoperative day 14, and will slowly dissolve over time. The steri-strips were placed over the Dermabond for reinforcement.      If you are checking the wound before postoperative day 14, trim any free edges of the Dermabond that are no longer adhered to the skin surface. To do this, grasp the free edge, apply gentle tension, and trim with a scissors. Inform the patient that the remaining Dermabond will eventually come off of the skin surface with bathing. Allow the steri-strips to fall-off on their own. Once they have fallen off, they no longer need to be replaced so long as the incision remains clean, dry, and intact. Between day 10-14 the single non-buried suture in the portal site will need to be removed. The ends of the buried absorbable running stitch can also be trimmed at the skin at this time time.      Once you have performed the general incision cares outlined above, please apply a fresh dressing to the wound using 4x4s and tape.      Should there be any concerns with the incision, please feel free to call our triage line at [phone], option #3, and ask to speak with one of the triage nurses.       Thanks in  advance in helping me out in the postoperative care of this patient.       Sincerely,

## 2017-07-28 NOTE — OP NOTE
PREOPERATIVE DIAGNOSES:   1. 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   2. Rule out tibiofemoral involvement.   3. S/P previous medial Tibial tubercle Osteotomy with ?? Medial imbrication    POSTOPERATIVE DIAGNOSES:   1. Right  patellofemoral chondrosis, mild, LPF  2. Satisfactory cartilage, trochlear groove.   3.  Pristine tibiofemoral joint.     OPERATIONS:   1.  Right  knee exam under anesthesia.   2. Diagnostic arthroscopy.   3.  Lateral retinacular lengthening (20mm).   4. Anterior [place] Release    : MD Heather Prince MD    ANESTHESIA: General endotracheal anesthesia      Exam under anesthesia of  Right  knee revealed range of   motion 5-0-140     Passive patellar mobility revealed 1 quadrants lateral mobility, 1+   quadrants medial mobility, negative medial patellar tilt test.      Arthroscopic findings revealed no fluid upon entry into the joint.     The patient's patellofemoral, medial and lateral compartment showed satisfactory cartilage with no focal defects, particularly in the patellofemoral joint.  surfaces when visualized and probed and  normal menisci.    Arthroscopic findings revealed no fluid upon entry into the joint.     PROCEDURE: Under  General endotracheal anesthesia, the patient was positioned supine on the operating table. The patient's leg was prepped and draped in the usual sterile fashion. A pause was performed identifying the correct leg and the administration of antibiotics.   A diagnostic arthroscopy was performed using anterolateral portal for inflow and visualization. Findings as above.   Instruments were then removed from the knee. Tourniquet was elevated to 250 mmHg after Esmarch exsanguination of the leg. A lateral incision was used lateral to the patella approximately the length of the patella. This was carried down to lateral retinaculum. We divided the layer one of the lateral retinaculum close to the patella and  layer 2 close to the insertion into the intermuscular septum.  We encountered much scar tissue in the anterior chamber, and released the adhesions between the patella tendon and the anterior tibia, and the up the medial chamber as far as we could reach.  This increased the mobility of the patella to 2 quadrants in each directioin  We then bent the knee to 60 degrees and sewed the near end of layer 1 to the far end of layer 2 resulting in a lengthening of 20 mm.   Tourniquet was let down at this point in time. Total tourniquet time was 14 minutes.   Bleeding was secured with an electrocautery.  Care was taken to secure a dry wound before closing.  Subcutaneous tissue was closed with 2-0 Vicryl. The skin was closed with running Monocryl.  Dermabond, Steri-Strips, Betadine, Adaptic, a sterile dressing and a Tubigrip stockinette was put in place.   The patient tolerated the procedure well and was taken to the recovery room in satisfactory condition.   Patient will be maintained weightbearing as tolerated in a knee immobilizer. He can remove the knee immobilizer for gentle range of motion and sitting position as tolerates. He should use this for all weightbearing activities for 2 weeks' time.   JOSE MIGUEL MCDERMOTT MD

## 2017-07-28 NOTE — PROGRESS NOTES
As patient got into wheelchair, we noticed wet spot in dressing. Dressing was reinforced with 4x4 gauze and supplies were sent home.

## 2017-07-28 NOTE — ANESTHESIA POSTPROCEDURE EVALUATION
Patient: Matthew Ravi    Procedure(s):  Right Knee Arthroscopy Plus Lateral Retinacular Lengthening and Anterior Chamber Release - Wound Class: I-Clean    Diagnosis:Patellofemoral Arthritis  Diagnosis Additional Information: No value filed.    Anesthesia Type:  General, LMA    Note:  Anesthesia Post Evaluation    Patient location during evaluation: PACU  Patient participation: Able to fully participate in evaluation  Level of consciousness: awake  Pain management: adequate  Airway patency: patent  Cardiovascular status: acceptable  Respiratory status: acceptable  Hydration status: acceptable  PONV: none     Anesthetic complications: None          Last vitals:  Vitals:    07/28/17 1209   BP: (!) 134/91   Temp: 36.6  C (97.9  F)         Electronically Signed By: Teo López MD  July 28, 2017  3:40 PM

## 2017-08-01 ENCOUNTER — TELEPHONE (OUTPATIENT)
Dept: ORTHOPEDICS | Facility: CLINIC | Age: 37
End: 2017-08-01

## 2017-08-01 NOTE — TELEPHONE ENCOUNTER
Update from patient and girlfriend.  Patient's girlfriend did dressing change last night and steristrips came off at that point due to drainage.  They changed dressing again today.  Girlfriend reports drainage has improved.  Steri strips from last evening have stayed in place.  Incision looks intact.  Writer encouraged them to leave steri strips in place, gauze on incision and use tubigrip for compression and use ice and elevation to help.  They will call me with any changes.  Advised them not to get it wet yet until drainage has stopped.  They have our phone number for further questions or concerns.

## 2017-08-01 NOTE — TELEPHONE ENCOUNTER
[facility]:  Nursing Note  SITUATION                                                      Matthew Ravi is a 36 year old male who calls with post-op drainage.    BACKGROUND                                                      Patient had surgery 7/28/17 for a Rt. Knee LRL and anterior chamber release.    Date of last clinic appointment: on7/28/17 for surgery    Does patient have a future appointment scheduled?  Yes -  next appointment is on: 8/29/17.    Patient had his dressing changed yesterday by his girlfriend and she said the steri strips came off.  He has noted more drainage through the dressing that is on now. He does not like blood and will not look at the incision now for me to see if it opened up.   His girlfriend will come there over her lunch and assess the incision.    ASSESSMENT      Post-op bleeding    PLAN                                                        Girlfriend will assess the drainage and will call back with an update.   I let April GRIJALVA know what is going on.     If further questions/concerns or if symptoms do not improve, worsen or new symptoms develop, patient/family are advised to call [phone], option #3 to speak with a triage nurse, as soon as possible.    Ashwini Yañez

## 2017-08-07 ENCOUNTER — TELEPHONE (OUTPATIENT)
Dept: ORTHOPEDICS | Facility: CLINIC | Age: 37
End: 2017-08-07

## 2017-08-07 ENCOUNTER — TRANSFERRED RECORDS (OUTPATIENT)
Dept: HEALTH INFORMATION MANAGEMENT | Facility: CLINIC | Age: 37
End: 2017-08-07

## 2017-08-07 NOTE — TELEPHONE ENCOUNTER
[facility]:  Nursing Note  SITUATION                                                      Matthew Ravi is a 36 year old male who was called regarding fever, burning at incision site and warmth.    BACKGROUND                                                      Patient is s/p Right Knee Arthroscopy Plus Lateral Retinacular Lengthening and Anterior Chamber Release on 7.28.17.    Date of last clinic appointment: on 5.9.17 with Dr. Goldstein    Does patient have a future appointment scheduled?  Yes -  next appointment is on: 8.29.17    Operative note reviewed in EPIC    ASSESSMENT      wound care concern - Burning at incision site, fever on Saturday 101.0, taking Tylenol for discomfort and swelling, knee is warm to touch. Last dressing change was Friday at PT, no concerns at that time. Has dressing and brace on now, not able to look at incision, skiddish around blood. Has PT apt today and will have dressing changed there.     PLAN                                                      Nursing Interventions: Continues with ice and elevation, continue with pain medications for discomfort advised Tylenol can mask a fever. Discussed UC/ER precautions, pt verbalized understanding.  RECOMMENDED DISPOSITION: Pt given phone # for Dr Goldstein and advised to reach out today while at PT to describe incision site.     Will comply with recommendation: Yes    Patient/family can be reached at: [phone].    If further questions/concerns or if symptoms do not improve, worsen or new symptoms develop, patient/family are advised to call [phone], option #3 to speak with a triage nurse, as soon as possible.    Mónica Shepherd

## 2017-08-08 ENCOUNTER — TELEPHONE (OUTPATIENT)
Dept: ORTHOPEDICS | Facility: CLINIC | Age: 37
End: 2017-08-08

## 2017-08-08 ENCOUNTER — TRANSFERRED RECORDS (OUTPATIENT)
Dept: HEALTH INFORMATION MANAGEMENT | Facility: CLINIC | Age: 37
End: 2017-08-08

## 2017-08-08 ENCOUNTER — DOCUMENTATION ONLY (OUTPATIENT)
Dept: ORTHOPEDICS | Facility: CLINIC | Age: 37
End: 2017-08-08

## 2017-08-08 DIAGNOSIS — M25.461 SWELLING OF RIGHT KNEE JOINT: Primary | ICD-10-CM

## 2017-08-08 NOTE — PROGRESS NOTES
Patient seen by primary care this AM.  Sent photos of knee to me yesterday.  It appears he has a knee bleed with retained hematoma; this is agreed upon by his LMD.    We will plan to see him Friday AM with intentions to perform a evacuation of retained hematoma.    This was discussed with patient and his wife.    Shilpi Goldstein MD  Professor Orthopedic Surgery  [facility]

## 2017-08-08 NOTE — TELEPHONE ENCOUNTER
[facility]:  Nursing Note  SITUATION                                                      Matthew Ravi is a 36 year old male who calls with report from seeing his primary MD for swelling.    BACKGROUND                                                      Patient is s/p right knee scope, LRL and anterior chamber release on 7/27/2017 with Dr. Goldstein.    Does patient have a future appointment scheduled? 8/29/2017    ASSESSMENT      Patients knee swollen and warm.  Patient saw his primary MD today after calling into triage yesterday and speaking with Dr. Goldstein.  Primary MD recommended patient contact us for the next step.  No antibiotics ordered by primary.    PLAN                                                      RECOMMENDED DISPOSITION: After discussion with Dr. Goldstein, she believes that patients knee may have had a bleed.  She is going to plan to wash his knee out in surgery this Friday.  Patient is aware of this plan.  We will get it scheduled and confirm the time with him tomorrow, Wednesday, by phone.  His H&P will be outdated by 3 days, Dr. Goldstein is aware of this, and either the resident or PA will update this the day of.    If further questions/concerns or if symptoms do not improve, worsen or new symptoms develop, patient/family are advised to call [phone], option #3 to speak with a triage nurse, as soon as possible.    Tiana John

## 2017-08-09 ENCOUNTER — MEDICAL CORRESPONDENCE (OUTPATIENT)
Dept: HEALTH INFORMATION MANAGEMENT | Facility: CLINIC | Age: 37
End: 2017-08-09

## 2017-08-09 ENCOUNTER — TELEPHONE (OUTPATIENT)
Dept: ORTHOPEDICS | Facility: CLINIC | Age: 37
End: 2017-08-09

## 2017-08-09 NOTE — TELEPHONE ENCOUNTER
Patient called with date, time and location of surgery for 8/11/2017.  Patient has our phone number for any further questions/concerns.

## 2017-08-10 ENCOUNTER — ANESTHESIA EVENT (OUTPATIENT)
Dept: SURGERY | Facility: AMBULATORY SURGERY CENTER | Age: 37
End: 2017-08-10

## 2017-08-11 ENCOUNTER — SURGERY (OUTPATIENT)
Age: 37
End: 2017-08-11

## 2017-08-11 ENCOUNTER — ANESTHESIA (OUTPATIENT)
Dept: SURGERY | Facility: AMBULATORY SURGERY CENTER | Age: 37
End: 2017-08-11

## 2017-08-11 ENCOUNTER — HOSPITAL ENCOUNTER (OUTPATIENT)
Facility: AMBULATORY SURGERY CENTER | Age: 37
End: 2017-08-11
Attending: ORTHOPAEDIC SURGERY

## 2017-08-11 VITALS
OXYGEN SATURATION: 95 % | BODY MASS INDEX: 33.59 KG/M2 | WEIGHT: 214 LBS | HEIGHT: 67 IN | SYSTOLIC BLOOD PRESSURE: 132 MMHG | DIASTOLIC BLOOD PRESSURE: 92 MMHG | RESPIRATION RATE: 16 BRPM | TEMPERATURE: 97.2 F

## 2017-08-11 DIAGNOSIS — M25.369 PATELLOFEMORAL INSTABILITY WITH PAIN, UNSPECIFIED LATERALITY: ICD-10-CM

## 2017-08-11 DIAGNOSIS — M25.569 PATELLOFEMORAL INSTABILITY WITH PAIN, UNSPECIFIED LATERALITY: ICD-10-CM

## 2017-08-11 RX ORDER — SODIUM CHLORIDE, SODIUM LACTATE, POTASSIUM CHLORIDE, CALCIUM CHLORIDE 600; 310; 30; 20 MG/100ML; MG/100ML; MG/100ML; MG/100ML
INJECTION, SOLUTION INTRAVENOUS CONTINUOUS
Status: DISCONTINUED | OUTPATIENT
Start: 2017-08-11 | End: 2017-08-12 | Stop reason: HOSPADM

## 2017-08-11 RX ORDER — SODIUM CHLORIDE, SODIUM LACTATE, POTASSIUM CHLORIDE, CALCIUM CHLORIDE 600; 310; 30; 20 MG/100ML; MG/100ML; MG/100ML; MG/100ML
INJECTION, SOLUTION INTRAVENOUS CONTINUOUS
Status: DISCONTINUED | OUTPATIENT
Start: 2017-08-11 | End: 2017-08-11 | Stop reason: HOSPADM

## 2017-08-11 RX ORDER — GABAPENTIN 300 MG/1
300 CAPSULE ORAL ONCE
Status: DISCONTINUED | OUTPATIENT
Start: 2017-08-11 | End: 2017-08-11 | Stop reason: HOSPADM

## 2017-08-11 RX ORDER — ACETAMINOPHEN 325 MG/1
975 TABLET ORAL ONCE
Status: DISCONTINUED | OUTPATIENT
Start: 2017-08-11 | End: 2017-08-11 | Stop reason: HOSPADM

## 2017-08-11 RX ORDER — ONDANSETRON 2 MG/ML
4 INJECTION INTRAMUSCULAR; INTRAVENOUS EVERY 30 MIN PRN
Status: DISCONTINUED | OUTPATIENT
Start: 2017-08-11 | End: 2017-08-12 | Stop reason: HOSPADM

## 2017-08-11 RX ORDER — FENTANYL CITRATE 50 UG/ML
INJECTION, SOLUTION INTRAMUSCULAR; INTRAVENOUS PRN
Status: DISCONTINUED | OUTPATIENT
Start: 2017-08-11 | End: 2017-08-11

## 2017-08-11 RX ORDER — ONDANSETRON 4 MG/1
4 TABLET, ORALLY DISINTEGRATING ORAL EVERY 30 MIN PRN
Status: DISCONTINUED | OUTPATIENT
Start: 2017-08-11 | End: 2017-08-12 | Stop reason: HOSPADM

## 2017-08-11 RX ORDER — LIDOCAINE HYDROCHLORIDE 20 MG/ML
INJECTION, SOLUTION INFILTRATION; PERINEURAL PRN
Status: DISCONTINUED | OUTPATIENT
Start: 2017-08-11 | End: 2017-08-11

## 2017-08-11 RX ORDER — PROPOFOL 10 MG/ML
INJECTION, EMULSION INTRAVENOUS PRN
Status: DISCONTINUED | OUTPATIENT
Start: 2017-08-11 | End: 2017-08-11

## 2017-08-11 RX ORDER — NALOXONE HYDROCHLORIDE 0.4 MG/ML
.1-.4 INJECTION, SOLUTION INTRAMUSCULAR; INTRAVENOUS; SUBCUTANEOUS
Status: DISCONTINUED | OUTPATIENT
Start: 2017-08-11 | End: 2017-08-12 | Stop reason: HOSPADM

## 2017-08-11 RX ORDER — BUPIVACAINE HYDROCHLORIDE 5 MG/ML
INJECTION, SOLUTION PERINEURAL PRN
Status: DISCONTINUED | OUTPATIENT
Start: 2017-08-11 | End: 2017-08-11 | Stop reason: HOSPADM

## 2017-08-11 RX ORDER — KETOROLAC TROMETHAMINE 30 MG/ML
30 INJECTION, SOLUTION INTRAMUSCULAR; INTRAVENOUS EVERY 6 HOURS PRN
Status: DISCONTINUED | OUTPATIENT
Start: 2017-08-11 | End: 2017-08-12 | Stop reason: HOSPADM

## 2017-08-11 RX ORDER — GABAPENTIN 300 MG/1
300 CAPSULE ORAL ONCE
Status: COMPLETED | OUTPATIENT
Start: 2017-08-11 | End: 2017-08-11

## 2017-08-11 RX ORDER — ACETAMINOPHEN 325 MG/1
975 TABLET ORAL ONCE
Status: COMPLETED | OUTPATIENT
Start: 2017-08-11 | End: 2017-08-11

## 2017-08-11 RX ORDER — DEXAMETHASONE SODIUM PHOSPHATE 4 MG/ML
INJECTION, SOLUTION INTRA-ARTICULAR; INTRALESIONAL; INTRAMUSCULAR; INTRAVENOUS; SOFT TISSUE PRN
Status: DISCONTINUED | OUTPATIENT
Start: 2017-08-11 | End: 2017-08-11

## 2017-08-11 RX ORDER — FENTANYL CITRATE 50 UG/ML
25-50 INJECTION, SOLUTION INTRAMUSCULAR; INTRAVENOUS EVERY 5 MIN PRN
Status: DISCONTINUED | OUTPATIENT
Start: 2017-08-11 | End: 2017-08-11 | Stop reason: HOSPADM

## 2017-08-11 RX ORDER — FENTANYL CITRATE 50 UG/ML
25-50 INJECTION, SOLUTION INTRAMUSCULAR; INTRAVENOUS
Status: DISCONTINUED | OUTPATIENT
Start: 2017-08-11 | End: 2017-08-12 | Stop reason: HOSPADM

## 2017-08-11 RX ORDER — MEPERIDINE HYDROCHLORIDE 25 MG/ML
12.5 INJECTION INTRAMUSCULAR; INTRAVENOUS; SUBCUTANEOUS
Status: DISCONTINUED | OUTPATIENT
Start: 2017-08-11 | End: 2017-08-12 | Stop reason: HOSPADM

## 2017-08-11 RX ORDER — LIDOCAINE 40 MG/G
CREAM TOPICAL
Status: DISCONTINUED | OUTPATIENT
Start: 2017-08-11 | End: 2017-08-11 | Stop reason: HOSPADM

## 2017-08-11 RX ORDER — CEFAZOLIN SODIUM 1 G/3ML
1 INJECTION, POWDER, FOR SOLUTION INTRAMUSCULAR; INTRAVENOUS SEE ADMIN INSTRUCTIONS
Status: DISCONTINUED | OUTPATIENT
Start: 2017-08-11 | End: 2017-08-11 | Stop reason: HOSPADM

## 2017-08-11 RX ORDER — GLYCOPYRROLATE 0.2 MG/ML
INJECTION, SOLUTION INTRAMUSCULAR; INTRAVENOUS PRN
Status: DISCONTINUED | OUTPATIENT
Start: 2017-08-11 | End: 2017-08-11

## 2017-08-11 RX ORDER — OXYCODONE HYDROCHLORIDE 5 MG/1
5 TABLET ORAL ONCE
Status: COMPLETED | OUTPATIENT
Start: 2017-08-11 | End: 2017-08-11

## 2017-08-11 RX ORDER — FENTANYL CITRATE 50 UG/ML
25-50 INJECTION, SOLUTION INTRAMUSCULAR; INTRAVENOUS
Status: DISCONTINUED | OUTPATIENT
Start: 2017-08-11 | End: 2017-08-11 | Stop reason: HOSPADM

## 2017-08-11 RX ORDER — ONDANSETRON 2 MG/ML
INJECTION INTRAMUSCULAR; INTRAVENOUS PRN
Status: DISCONTINUED | OUTPATIENT
Start: 2017-08-11 | End: 2017-08-11

## 2017-08-11 RX ORDER — OXYCODONE HYDROCHLORIDE 5 MG/1
5-10 TABLET ORAL EVERY 4 HOURS PRN
Qty: 20 TABLET | Refills: 0 | Status: SHIPPED | OUTPATIENT
Start: 2017-08-11

## 2017-08-11 RX ORDER — SODIUM CHLORIDE, SODIUM LACTATE, POTASSIUM CHLORIDE, CALCIUM CHLORIDE 600; 310; 30; 20 MG/100ML; MG/100ML; MG/100ML; MG/100ML
INJECTION, SOLUTION INTRAVENOUS CONTINUOUS PRN
Status: DISCONTINUED | OUTPATIENT
Start: 2017-08-11 | End: 2017-08-11

## 2017-08-11 RX ORDER — AMOXICILLIN 250 MG
1-2 CAPSULE ORAL 2 TIMES DAILY
Qty: 20 TABLET | Refills: 0 | Status: SHIPPED | OUTPATIENT
Start: 2017-08-11

## 2017-08-11 RX ORDER — KETOROLAC TROMETHAMINE 30 MG/ML
INJECTION, SOLUTION INTRAMUSCULAR; INTRAVENOUS PRN
Status: DISCONTINUED | OUTPATIENT
Start: 2017-08-11 | End: 2017-08-11

## 2017-08-11 RX ADMIN — FENTANYL CITRATE 50 MCG: 50 INJECTION, SOLUTION INTRAMUSCULAR; INTRAVENOUS at 09:50

## 2017-08-11 RX ADMIN — GLYCOPYRROLATE 0.2 MG: 0.2 INJECTION, SOLUTION INTRAMUSCULAR; INTRAVENOUS at 09:37

## 2017-08-11 RX ADMIN — FENTANYL CITRATE 25 MCG: 50 INJECTION, SOLUTION INTRAMUSCULAR; INTRAVENOUS at 10:44

## 2017-08-11 RX ADMIN — FENTANYL CITRATE 50 MCG: 50 INJECTION, SOLUTION INTRAMUSCULAR; INTRAVENOUS at 09:37

## 2017-08-11 RX ADMIN — ONDANSETRON 4 MG: 2 INJECTION INTRAMUSCULAR; INTRAVENOUS at 10:00

## 2017-08-11 RX ADMIN — ACETAMINOPHEN 975 MG: 325 TABLET ORAL at 07:23

## 2017-08-11 RX ADMIN — BUPIVACAINE HYDROCHLORIDE 20 ML: 5 INJECTION, SOLUTION PERINEURAL at 10:09

## 2017-08-11 RX ADMIN — FENTANYL CITRATE 25 MCG: 50 INJECTION, SOLUTION INTRAMUSCULAR; INTRAVENOUS at 10:50

## 2017-08-11 RX ADMIN — SODIUM CHLORIDE, SODIUM LACTATE, POTASSIUM CHLORIDE, CALCIUM CHLORIDE: 600; 310; 30; 20 INJECTION, SOLUTION INTRAVENOUS at 09:35

## 2017-08-11 RX ADMIN — OXYCODONE HYDROCHLORIDE 5 MG: 5 TABLET ORAL at 10:38

## 2017-08-11 RX ADMIN — DEXAMETHASONE SODIUM PHOSPHATE 4 MG: 4 INJECTION, SOLUTION INTRA-ARTICULAR; INTRALESIONAL; INTRAMUSCULAR; INTRAVENOUS; SOFT TISSUE at 09:43

## 2017-08-11 RX ADMIN — LIDOCAINE HYDROCHLORIDE 100 MG: 20 INJECTION, SOLUTION INFILTRATION; PERINEURAL at 09:39

## 2017-08-11 RX ADMIN — KETOROLAC TROMETHAMINE 30 MG: 30 INJECTION, SOLUTION INTRAMUSCULAR; INTRAVENOUS at 10:00

## 2017-08-11 RX ADMIN — GABAPENTIN 300 MG: 300 CAPSULE ORAL at 07:23

## 2017-08-11 RX ADMIN — PROPOFOL 200 MG: 10 INJECTION, EMULSION INTRAVENOUS at 09:39

## 2017-08-11 NOTE — ANESTHESIA CARE TRANSFER NOTE
Patient: Matthew Ravi    Procedure(s):  Right Knee Irrigation and Debridement   - Wound Class: I-Clean    Diagnosis: Hemotoma  Diagnosis Additional Information: No value filed.    Anesthesia Type:   General, LMA, Periph. Nerve Block for postop pain     Note:  Airway :Room Air  Patient transferred to:PACU  Comments: Arrive PACU, Stable, Airway Intact  139/88, 102,16,100%  All questions answered.        Vitals: (Last set prior to Anesthesia Care Transfer)    CRNA VITALS  8/11/2017 0943 - 8/11/2017 1018      8/11/2017             Pulse: 115    Ht Rate: 108    SpO2: 97 %                Electronically Signed By: JUS Power CRNA  August 11, 2017  10:18 AM

## 2017-08-11 NOTE — ANESTHESIA POSTPROCEDURE EVALUATION
Patient: Matthew Ravi    Procedure(s):  Right Knee Irrigation and Debridement   - Wound Class: I-Clean    Diagnosis:Hemotoma  Diagnosis Additional Information: No value filed.    Anesthesia Type:  General, LMA, Periph. Nerve Block for postop pain    Note:  Anesthesia Post Evaluation    Patient location during evaluation: PACU  Patient participation: Able to fully participate in evaluation  Level of consciousness: awake and alert  Pain management: adequate  Airway patency: patent  Cardiovascular status: hemodynamically stable  Respiratory status: acceptable  Hydration status: stable  PONV: none     Anesthetic complications: None          Last vitals:  Vitals:    08/11/17 0629   BP: (!) 116/99   Resp: 16   Temp: 36.8  C (98.3  F)   SpO2: 96%         Electronically Signed By: Juan R Tang MD  August 11, 2017  10:22 AM

## 2017-08-11 NOTE — IP AVS SNAPSHOT
MRN:[id number]                      After Visit Summary   8/11/2017    Matthew Ravi    MRN: [id number]           Thank you!     Thank you for choosing [facility] for your care. Our goal is always to provide you with excellent care. Hearing back from our patients is one way we can continue to improve our services. Please take a few minutes to complete the written survey that you may receive in the mail after you visit with us. Thank you!        Patient Information     Date Of Birth          1980        About your hospital stay     You were admitted on:  August 11, 2017 [facility]    You were discharged on:  August 11, 2017       Who to Call     For medical emergencies, please call 911.  For non-urgent questions about your medical care, please call your primary care provider or clinic, [phone]  For questions related to your surgery, please call your surgery clinic        Attending Provider     Provider Shilpi Euceda MD Orthopedics       Primary Care Provider Office Phone # Fax #    Keon Danny Ac [phone]      After Care Instructions     Discharge Instructions       POST OPERATIVE INSTRUCTIONS    A.  COMFORT:  -Elevation: Elevate your knee and ankle above the level of your heart. The best position is lying down with two pillows lengthwise under your entire leg. This should be done for the first several days after surgery.  -Swelling: An ice pack will be provided to control swelling and discomfort.  Once the initial dressing is removed, place a thin towel between your skin and the ice pack.  -Pain Medication: Take medication as prescribed, but only as often as necessary. Avoid alcohol and driving if you are taking pain medication.  Remember that Tylenol can be used for pain relief as you wean off the narcotics. The maximum Tylenol dose for a single day is 3000 mg.          B.   ACTIVITIES:  -Exercises: Point and flex your feet and wiggle your toes, move your ankles clockwise and counterclockwise in circular motions, to help prevent complications such as blood clotting in your legs. Thigh muscle tightening exercises should begin the day of surgery - Tighten quadriceps muscles on top of thigh so the kneecap is pulled up and leg is completely straight. Hold to a count of five. Relax 1-2 seconds between each exercise. Do ten times each hour.  -Weight bearing status: You may weight bear on your leg as tolerated.    C. INCISION CARE:    -Keep the initial post-op dressing on, clean and dry for 3 days after surgery. You may then remove the dressing and shower. If the dressing feels too tight or you are experiencing numbness swelling, or tingling below the dressing, remove the outer dressing.  -The steri-strips (small white tape that is directly on the incision areas) should be left on until they fall off or are removed at your first office visit. If the wound is not healing, new steri-strips should be applied. If a little drainage from the wound occurs after you have removed the original dressing, use a band-aid. If the drainage increases, CALL YOUR DOCTOR.  Redress the knee with tubi- or ace wrap as long as swelling persists.   -Avoid soaking the wound in a pool or bath for TEN days (or until the skin has healed over the surgical area). When taking a shower, let water run over the wound, pat dry. DO NOT rub or scrub the wound area. The wound itself can be cleaned with rubbing alcohol or peroxide. You may also use rubbing alcohol to remove betadine or other skin prep solution from the leg.    D. CALL YOUR PHYSICIAN IF:  -Pain in your knee persists or worsens in the first few days after surgery, or is not relieved by Tylenol or the narcotic prescribed to you.  -You have excessive redness or drainage of cloudy or bloody material from the wounds (Clear red tinted fluid and some mild drainage  should be expected). Drainage of any kind 5 days after surgery should be reported to the doctor.  -You have signs of infection such as a temperature elevation greater than 101.5 , increased tenderness, swelling or redness at surgical site.  -You have pain, swelling or redness in your calf that increases over time.  -You have numbness or weakness in your leg or foot.  -You injure your leg or knee.      You may contact your physician at [phone] during business hours.    For after hours or weekend calls, you may contact the hospital at [phone] and ask for the on-call orthopedic resident            Ice to affected area       Ice pack to surgical site every 15 minutes per hour for 24 hours            No Alcohol       While on narcotic medication            No driving or operating machinery       Do not drive or operate machinery until narcotic pain medications are discontinued            Shower       You may remove the dressing and shower on POD #3            Weight bearing - As tolerated       Weight bearing as tolerated with crutches until normal gait is restored                  Your next 10 appointments already scheduled     Aug 29, 2017 10:40 AM CDT   LE Performance Test Initial with Shilpi Dave PT   [facility] ([facility])    [address] [place] [address] [address] [facility] [address] [phone]            Aug 29, 2017 11:15 AM CDT   (Arrive by 11:00 AM)   RETURN KNEE with Shilpi Goldstein MD   [facility] ([facility] [place])    [address] [place] [address]  [address] [facility] [address] [phone]              Further instructions from your care team       [facility]  Home Care Following Anesthesia  For 24 hours after surgery:  1. Get plenty of rest.  A responsible adult must stay with you for at least 24 hours after you leave the surgery center.  2. Do not drive  or use heavy equipment.  If you have weakness or tingling, don't drive or use heavy equipment until this feeling goes away.   3. Do not drink alcohol.   4. Avoid strenuous or risky activities.  Ask for help when climbing stairs.  5. You may feel lightheaded.  IF so, sit for a few minutes before standing.  Have someone help you get up.   6. If you have nausea (feel sick to your stomach): Drink only clear liquids such as apple juice, ginger ale, broth or 7-Up.  Rest may also help.  Be sure to drink enough fluids.  Move to a regular diet as you feel able.   7. You may have a slight fever.  Call the doctor if your fever is over 100 F (37.7 C) (taken under the tongue) or lasts longer than 24 hours.  8. You may have a dry mouth, a sore throat, muscle aches or trouble sleeping. These should go away after 24 hours.  9. Do not make important or legal decisions.               Tips for taking pain medications  To get the best pain relief possible, remember these points:    Take pain medications as directed, before pain becomes severe.    Pain medication can upset your stomach: taking it with food may help.    Constipation is a common side effect of pain medication. Drink plenty of  fluids.    Eat foods high in fiber. Take a stool softener if recommended by your doctor or pharmacist.    Do not drink alcohol, drive or operate machinery while taking pain medications.    Ask about other ways to control pain, such as with heat, ice or relaxation.    Tylenol/Acetaminophen Consumption  To help encourage the safe use of acetaminophen, the makers of TYLENOL  have lowered the maximum daily dose for single-ingredient Extra Strength TYLENOL  (acetaminophen) products sold in the [place] from 8 pills per day (4,000 mg) to 6 pills per day (3,000 mg). The dosing interval has also changed from 2 pills every 4-6 hours to 2 pills every 6 hours.    If you feel your pain relief is insufficient, you may take Tylenol/Acetaminophen in addition to your  "narcotic pain medication.     Be careful not to exceed 3,000 mg of Tylenol/Acetaminophen in a 24 hour period from all sources.    If you are taking extra strength Tylenol/acetaminophen (500 mg), the maximum dose is 6 tablets in 24 hours.    If you are taking regular strength acetaminophen (325 mg), the maximum dose is 9 tablets in 24 hours.    Call a doctor for any of the followin. Signs of infection (fever, growing tenderness at the surgery site, a large amount of drainage or bleeding, severe pain, foul-smelling drainage, redness, swelling).  2. It has been over 8 to 10 hours since surgery and you are still not able to urinate (pass water).  3. Headache for over 24 hours.  4. Numbness, tingling or weakness the day after surgery (if you had spinal anesthesia).  Your doctor is:  Dr. Shilpi Goldstein, Orthopaedics: [phone]                    Or dial [phone] and ask for the resident on call for:  Orthopaedics  For emergency care, call the:  [place] [facility]:  [phone] (TTY for hearing impaired: [phone])                Pending Results     No orders found from 2017 to 2017.            Admission Information     Date & Time Provider Department Dept. Phone    2017 Shilpi Goldstein MD [facility] [phone]      Your Vitals Were     Blood Pressure Temperature Respirations Height Weight Pulse Oximetry    116/99 97.4  F (36.3  C) (Temporal) 16 1.702 m (5' 7\") 97.1 kg (214 lb) 96%    BMI (Body Mass Index)                   33.52 kg/m2           [employer] Information     [employer] gives you secure access to your electronic health record. If you see a primary care provider, you can also send messages to your care team and make appointments. If you have questions, please call your primary care clinic.  If you do not have a primary care provider, please call [phone] and they will assist you.      [employer] is an electronic gateway that provides easy, " online access to your medical records. With [employer], you can request a clinic appointment, read your test results, renew a prescription or communicate with your care team.     To access your existing account, please contact your [facility] or call [phone] for assistance.        Care EveryWhere ID     This is your Care EveryWhere ID. This could be used by other organizations to access your [facility] medical records  [id number]        Equal Access to Services     ADEN DAVIS : Hadii guilherme mendozao Soyaneth, waaxda luqadaha, qaybta kaalmada adejob, allen mercadoronyradha angeles. So [facility] [phone].    ATENCIÓN: Si adelinala todd, tiene a aguiar disposición servicios gratuitos de asistencia lingüística. Cotyame al [phone].    We comply with applicable federal civil rights laws and [place] laws. We do not discriminate on the basis of race, color, national origin, age, disability sex, sexual orientation or gender identity.               Review of your medicines      CONTINUE these medicines which may have CHANGED, or have new prescriptions. If we are uncertain of the size of tablets/capsules you have at home, strength may be listed as something that might have changed.        Dose / Directions    oxyCODONE 5 MG IR tablet   Commonly known as:  ROXICODONE   This may have changed:  when to take this   Used for:  Patellofemoral instability with pain, unspecified laterality        Dose:  5-10 mg   Take 1-2 tablets (5-10 mg) by mouth every 4 hours as needed for pain or other (Moderate to Severe)   Quantity:  20 tablet   Refills:  0         CONTINUE these medicines which have NOT CHANGED        Dose / Directions    acetaminophen 325 MG tablet   Commonly known as:  TYLENOL   Used for:  Patellofemoral instability with pain, unspecified laterality        Dose:  650 mg   Take 2 tablets (650 mg) by mouth every 4 hours as needed for other (mild pain)   Quantity:  100 tablet    Refills:  0       ATORVASTATIN CALCIUM PO        Dose:  10 mg   Take 10 mg by mouth every morning   Refills:  0       camphor-menthol 0.5-0.5 % Lotn   Commonly known as:  DERMASARRA        Apply topically daily as needed for skin care   Refills:  0       FEXOFENADINE HCL PO        Dose:  60 mg   Take 60 mg by mouth daily as needed for allergies   Refills:  0       fluocinonide 0.05 % cream   Commonly known as:  LIDEX        Apply topically 2 times daily   Refills:  0       fluticasone 50 MCG/ACT spray   Commonly known as:  FLONASE        Dose:  2 spray   Spray 2 sprays into both nostrils daily   Refills:  0       HYDROXYZINE PAMOATE PO        Dose:  25 mg   Take 25 mg by mouth every 6 hours as needed for itching   Refills:  0       meclizine 25 MG Chew        Dose:  25 mg   Take 25 mg by mouth 2 times daily as needed for dizziness (1/2 tablet)   Refills:  0       senna-docusate 8.6-50 MG per tablet   Commonly known as:  SENOKOT-S;PERICOLACE   Used for:  Patellofemoral instability with pain, unspecified laterality        Dose:  1-2 tablet   Take 1-2 tablets by mouth 2 times daily Take while on oral narcotics to prevent or treat constipation.   Quantity:  20 tablet   Refills:  0       triamcinolone 0.1 % cream   Commonly known as:  KENALOG        Apply topically 2 times daily as needed for irritation   Refills:  0            Where to get your medicines      These medications were sent to [facility] [place], [place] - [address] [facility] [address] [place] [address]    Hours:  TRANSPLANT PHONE NUMBER [phone] Phone:  [phone]     senna-docusate 8.6-50 MG per tablet         Some of these will need a paper prescription and others can be bought over the counter. Ask your nurse if you have questions.     Bring a paper prescription for each of these medications     oxyCODONE 5 MG IR tablet                Protect others around you: Learn how to safely use, store  and throw away your medicines at www.disposemymeds.org.             Medication List: This is a list of all your medications and when to take them. Check marks below indicate your daily home schedule. Keep this list as a reference.      Medications           Morning Afternoon Evening Bedtime As Needed    acetaminophen 325 MG tablet   Commonly known as:  TYLENOL   Take 2 tablets (650 mg) by mouth every 4 hours as needed for other (mild pain)   Last time this was given:  975 mg on 8/11/2017  7:23 AM                                ATORVASTATIN CALCIUM PO   Take 10 mg by mouth every morning                                camphor-menthol 0.5-0.5 % Lotn   Commonly known as:  DERMASARRA   Apply topically daily as needed for skin care                                FEXOFENADINE HCL PO   Take 60 mg by mouth daily as needed for allergies                                fluocinonide 0.05 % cream   Commonly known as:  LIDEX   Apply topically 2 times daily                                fluticasone 50 MCG/ACT spray   Commonly known as:  FLONASE   Spray 2 sprays into both nostrils daily                                HYDROXYZINE PAMOATE PO   Take 25 mg by mouth every 6 hours as needed for itching                                meclizine 25 MG Chew   Take 25 mg by mouth 2 times daily as needed for dizziness (1/2 tablet)                                oxyCODONE 5 MG IR tablet   Commonly known as:  ROXICODONE   Take 1-2 tablets (5-10 mg) by mouth every 4 hours as needed for pain or other (Moderate to Severe)                                senna-docusate 8.6-50 MG per tablet   Commonly known as:  SENOKOT-S;PERICOLACE   Take 1-2 tablets by mouth 2 times daily Take while on oral narcotics to prevent or treat constipation.                                triamcinolone 0.1 % cream   Commonly known as:  KENALOG   Apply topically 2 times daily as needed for irritation

## 2017-08-11 NOTE — OP NOTE
PREOPERATIVE DIAGNOSES:   1. Right knee acute hemarthrosis  2. S/P previous lateral facetectomy     POSTOPERATIVE DIAGNOSES:   1. Same    OPERATIONS:   1.  Right  knee exam under anesthesia.   2. Irrigation and debridement of Right knee    ANESTHESIA: General endotracheal anesthesia supplemented with local anethesia (20 cc 1/4% bupivicaine     Exam under anesthesia of Right  knee revealed range of   motion 20-70 degrees.  Post op ROM 0- 120 degrees.    .   PROCEDURE: Under  General endotracheal anesthesia, the patient was positioned supine on the operating table. The patient's leg was prepped and draped in the usual sterile fashion. A pause was performed identifying the correct leg and the administration of antibiotics.   The patient's knee had the original incision debrided of any surrounding blood. The wound was intact.    We made a small 1 inch incision on the superior lateral aspect of the knee. Through this we're able to extract approximately 80 cc of congealed blood. We then used a pulse lavage with a now sterile to irrigate out the knee.  We then  re- ranged the knee and found much improved motion.  Subcutaneous tissue was closed with 2-0 Vicryl. The skin was closed with running Monocryl. Steri-Strips, Betadine, Adaptic, a sterile dressing and a Tubigrip stockinette was put in place.   The patient tolerated the procedure well and was taken to the recovery room in satisfactory condition.   Patient will be maintained weightbearing as tolerated in his previous brace. He can remove the knee brace for gentle range of motion and sitting position as tolerates. He should use this for all weightbearing activities for 2 weeks' time.   He can resume his previous physical therapy.  JOSE MIGUEL MCDERMOTT MD

## 2017-08-11 NOTE — DISCHARGE INSTRUCTIONS
[facility]  Home Care Following Anesthesia  For 24 hours after surgery:  1. Get plenty of rest.  A responsible adult must stay with you for at least 24 hours after you leave the surgery center.  2. Do not drive or use heavy equipment.  If you have weakness or tingling, don't drive or use heavy equipment until this feeling goes away.   3. Do not drink alcohol.   4. Avoid strenuous or risky activities.  Ask for help when climbing stairs.  5. You may feel lightheaded.  IF so, sit for a few minutes before standing.  Have someone help you get up.   6. If you have nausea (feel sick to your stomach): Drink only clear liquids such as apple juice, ginger ale, broth or 7-Up.  Rest may also help.  Be sure to drink enough fluids.  Move to a regular diet as you feel able.   7. You may have a slight fever.  Call the doctor if your fever is over 100 F (37.7 C) (taken under the tongue) or lasts longer than 24 hours.  8. You may have a dry mouth, a sore throat, muscle aches or trouble sleeping. These should go away after 24 hours.  9. Do not make important or legal decisions.               Tips for taking pain medications  To get the best pain relief possible, remember these points:    Take pain medications as directed, before pain becomes severe.    Pain medication can upset your stomach: taking it with food may help.    Constipation is a common side effect of pain medication. Drink plenty of  fluids.    Eat foods high in fiber. Take a stool softener if recommended by your doctor or pharmacist.    Do not drink alcohol, drive or operate machinery while taking pain medications.    Ask about other ways to control pain, such as with heat, ice or relaxation.    Tylenol/Acetaminophen Consumption  To help encourage the safe use of acetaminophen, the makers of TYLENOL  have lowered the maximum daily dose for single-ingredient Extra Strength TYLENOL  (acetaminophen) products sold in the [place] from 8  pills per day (4,000 mg) to 6 pills per day (3,000 mg). The dosing interval has also changed from 2 pills every 4-6 hours to 2 pills every 6 hours.    If you feel your pain relief is insufficient, you may take Tylenol/Acetaminophen in addition to your narcotic pain medication.     Be careful not to exceed 3,000 mg of Tylenol/Acetaminophen in a 24 hour period from all sources.    If you are taking extra strength Tylenol/acetaminophen (500 mg), the maximum dose is 6 tablets in 24 hours.    If you are taking regular strength acetaminophen (325 mg), the maximum dose is 9 tablets in 24 hours.    Call a doctor for any of the followin. Signs of infection (fever, growing tenderness at the surgery site, a large amount of drainage or bleeding, severe pain, foul-smelling drainage, redness, swelling).  2. It has been over 8 to 10 hours since surgery and you are still not able to urinate (pass water).  3. Headache for over 24 hours.  4. Numbness, tingling or weakness the day after surgery (if you had spinal anesthesia).  Your doctor is:  Dr. Shilpi Goldstein, Orthopaedics: [phone]                    Or dial [phone] and ask for the resident on call for:  Orthopaedics  For emergency care, call the:  [facility]:  [phone] (TTY for hearing impaired: [phone])

## 2017-08-11 NOTE — ANESTHESIA PREPROCEDURE EVALUATION
Anesthesia Evaluation     .             ROS/MED HX    ENT/Pulmonary:  - neg pulmonary ROS     Neurologic:  - neg neurologic ROS     Cardiovascular:     (+) hypertension----. : . . . :. .       METS/Exercise Tolerance:     Hematologic:  - neg hematologic  ROS       Musculoskeletal:  - neg musculoskeletal ROS       GI/Hepatic:  - neg GI/hepatic ROS       Renal/Genitourinary:  - ROS Renal section negative       Endo:  - neg endo ROS       Psychiatric:  - neg psychiatric ROS       Infectious Disease:  - neg infectious disease ROS       Malignancy:      - no malignancy   Other:    - neg other ROS                 Physical Exam  Normal systems: cardiovascular, pulmonary and dental    Airway   Mallampati: I  TM distance: >3 FB  Neck ROM: full    Dental     Cardiovascular   Rhythm and rate: regular and normal      Pulmonary    breath sounds clear to auscultation                    Anesthesia Plan      History & Physical Review  History and physical reviewed and following examination; no interval change.    ASA Status:  2 .    NPO Status:  > 8 hours    Plan for General, LMA and Periph. Nerve Block for postop pain with Intravenous and Propofol induction. Maintenance will be Inhalation.    PONV prophylaxis:  Ondansetron (or other 5HT-3) and Dexamethasone or Solumedrol       Postoperative Care  Postoperative pain management:  IV analgesics.      Consents  Anesthetic plan, risks, benefits and alternatives discussed with:  Patient..                          .

## 2017-08-11 NOTE — IP AVS SNAPSHOT
[facility]    [address] [place] [address]    Phone:  [phone]    Fax:  [phone]                                       After Visit Summary   8/11/2017    Matthew Ravi    MRN: [id number]           After Visit Summary Signature Page     I have received my discharge instructions, and my questions have been answered. I have discussed any challenges I see with this plan with the nurse or doctor.    ..........................................................................................................................................  Patient/Patient Representative Signature      ..........................................................................................................................................  Patient Representative Print Name and Relationship to Patient    ..................................................               ................................................  Date                                            Time    ..........................................................................................................................................  Reviewed by Signature/Title    ...................................................              ..............................................  Date                                                            Time

## 2017-08-17 ENCOUNTER — MEDICAL CORRESPONDENCE (OUTPATIENT)
Dept: HEALTH INFORMATION MANAGEMENT | Facility: CLINIC | Age: 37
End: 2017-08-17

## 2017-08-29 ENCOUNTER — THERAPY VISIT (OUTPATIENT)
Dept: PHYSICAL THERAPY | Facility: CLINIC | Age: 37
End: 2017-08-29
Payer: COMMERCIAL

## 2017-08-29 ENCOUNTER — OFFICE VISIT (OUTPATIENT)
Dept: ORTHOPEDICS | Facility: CLINIC | Age: 37
End: 2017-08-29

## 2017-08-29 DIAGNOSIS — M25.569 PATELLOFEMORAL INSTABILITY WITH PAIN, UNSPECIFIED LATERALITY: Primary | ICD-10-CM

## 2017-08-29 DIAGNOSIS — M25.369 PATELLOFEMORAL INSTABILITY WITH PAIN, UNSPECIFIED LATERALITY: Primary | ICD-10-CM

## 2017-08-29 DIAGNOSIS — M25.561 RIGHT KNEE PAIN: Primary | ICD-10-CM

## 2017-08-29 PROCEDURE — 97161 PT EVAL LOW COMPLEX 20 MIN: CPT | Mod: GP | Performed by: PHYSICAL THERAPIST

## 2017-08-29 PROCEDURE — 97140 MANUAL THERAPY 1/> REGIONS: CPT | Mod: GP | Performed by: PHYSICAL THERAPIST

## 2017-08-29 PROCEDURE — 97110 THERAPEUTIC EXERCISES: CPT | Mod: GP | Performed by: PHYSICAL THERAPIST

## 2017-08-29 PROCEDURE — 97112 NEUROMUSCULAR REEDUCATION: CPT | Mod: GP | Performed by: PHYSICAL THERAPIST

## 2017-08-29 ASSESSMENT — ACTIVITIES OF DAILY LIVING (ADL)
HOW_WOULD_YOU_RATE_THE_CURRENT_FUNCTION_OF_YOUR_KNEE_DURING_YOUR_USUAL_DAILY_ACTIVITIES_ON_A_SCALE_FROM_0_TO_100_WITH_100_BEING_YOUR_LEVEL_OF_KNEE_FUNCTION_PRIOR_TO_YOUR_INJURY_AND_0_BEING_THE_INABILITY_TO_PERFORM_ANY_OF_YOUR_USUAL_DAILY_ACTIVITIES?: 50
PAIN: THE SYMPTOM AFFECTS MY ACTIVITY MODERATELY
GO DOWN STAIRS: ACTIVITY IS FAIRLY DIFFICULT
KNEE_ACTIVITY_OF_DAILY_LIVING_SCORE: 42.86
LIMPING: THE SYMPTOM AFFECTS MY ACTIVITY SLIGHTLY
SIT WITH YOUR KNEE BENT: ACTIVITY IS MINIMALLY DIFFICULT
AS_A_RESULT_OF_YOUR_KNEE_INJURY,_HOW_WOULD_YOU_RATE_YOUR_CURRENT_LEVEL_OF_DAILY_ACTIVITY?: ABNORMAL
KNEEL ON THE FRONT OF YOUR KNEE: I AM UNABLE TO DO THE ACTIVITY
WALK: ACTIVITY IS FAIRLY DIFFICULT
GO UP STAIRS: ACTIVITY IS FAIRLY DIFFICULT
HOW_WOULD_YOU_RATE_THE_OVERALL_FUNCTION_OF_YOUR_KNEE_DURING_YOUR_USUAL_DAILY_ACTIVITIES?: ABNORMAL
SWELLING: THE SYMPTOM AFFECTS MY ACTIVITY SEVERELY
GIVING WAY, BUCKLING OR SHIFTING OF KNEE: THE SYMPTOM AFFECTS MY ACTIVITY SLIGHTLY
STIFFNESS: THE SYMPTOM AFFECTS MY ACTIVITY MODERATELY
SQUAT: I AM UNABLE TO DO THE ACTIVITY
KNEE_ACTIVITY_OF_DAILY_LIVING_SUM: 30
STAND: ACTIVITY IS FAIRLY DIFFICULT
WEAKNESS: THE SYMPTOM AFFECTS MY ACTIVITY SLIGHTLY
RAW_SCORE: 30
RISE FROM A CHAIR: ACTIVITY IS MINIMALLY DIFFICULT

## 2017-08-29 ASSESSMENT — ENCOUNTER SYMPTOMS
NECK PAIN: 0
STIFFNESS: 1
ARTHRALGIAS: 1
JOINT SWELLING: 1
MUSCLE CRAMPS: 0
BACK PAIN: 0
MYALGIAS: 1
MUSCLE WEAKNESS: 1

## 2017-08-29 NOTE — PROGRESS NOTES
Subjective:    HPI                  Physical Therapy Initial Evaluation: Subjective History  Date of Surgery: 7/28/17; 8/11/17.    Surgical Procedure/Limb: s/p LRL, anterior chamber debridement; subsequent irrigation and debridement  Surgeon Name: Dr. Goldstein  Average Daily Pain Levels: 5-7/10 (Location: anterior; Quality: Sharp)  Other Symptoms: swelling  Symptom Mgmt Strategies: bracing, icing occasionally  Prior orthopaedic history/procedures: s/p TTO in 2012  Prior non-operative management: PT, surgery  Next MD Appt Date: Today    Functional limitations following procedure: Difficulty with ambulation, stairs, sitting  Previous level of function: limited due to pain in the knee    Patient Employment: Medical disability  Typical Physical Activities: walking    Post-Operative Physical Therapy Examination    Physical Mobility Status  Gait: Able to demonstrate normalizing gait pattern without brace - slight increase in stance time on R LE.    Transfers:  independent    Anthropometric Measures     Right Left Difference   Joint ROM      Hyperextension 4 deg 3 deg 1 deg   Extension 0 deg 0 deg 0 deg   Flexion 115 deg 144 deg 29 deg   Circumferential Measures      Joint Line 40.5 cm 43.5 cm 3 cm   15 cm Prox 51.5 cm 51 cm 0.5 cm   Effusion 2+ 0        Quadriceps Muscle Activation Right Left   Isometric Quad Activation Good Normal   Straight Leg Raising No extensor lag No extensor lag     Status of Incision: Clean & healing    Core stability screen:  Bridging reveals poor pelvifemoral control  Prone plank: 5 seconds     Squat mechanics:  Significant offloading of R knee during squat - roughly 30#    Exercises instructed:  1. Squatting on scales for equal weightbearing  2. High lunge knee flexion stretch  3.  Step up focusing on quadriceps activation  4.  Balance  5. Core stability - bridging, planks    Objective:    System    Physical Exam    General     ROS    Assessment/Plan:      Patient is a 36 year old male with right  side knee complaints.    Patient has the following significant findings with corresponding treatment plan.                Diagnosis 1:  S/p LRL, debridement    Pain -  manual therapy, self management, education and home program  Decreased ROM/flexibility - manual therapy and therapeutic exercise  Decreased joint mobility - manual therapy and therapeutic exercise  Decreased strength - therapeutic exercise and therapeutic activities  Impaired balance - neuro re-education and therapeutic activities  Decreased proprioception - neuro re-education and therapeutic activities  Inflammation - cold therapy  Edema - vasopneumatics  Impaired gait - gait training  Impaired muscle performance - neuro re-education  Decreased function - therapeutic activities    Therapy Evaluation Codes:   1) History comprised of:   Personal factors that impact the plan of care:      Time since onset of symptoms.    Comorbidity factors that impact the plan of care are:      None.     Medications impacting care: None.  2) Examination of Body Systems comprised of:   Body structures and functions that impact the plan of care:      Knee.   Activity limitations that impact the plan of care are:      Jumping, Lifting, Running, Sitting, Sports, Squatting/kneeling, Stairs, Standing and Walking.  3) Clinical presentation characteristics are:   Stable/Uncomplicated.  4) Decision-Making    Low complexity using standardized patient assessment instrument and/or measureable assessment of functional outcome.  Cumulative Therapy Evaluation is: Low complexity.    Previous and current functional limitations:  (See Goal Flow Sheet for this information)    Short term and Long term goals: (See Goal Flow Sheet for this information)     Communication ability:  Patient appears to be able to clearly communicate and understand verbal and written communication and follow directions correctly.  Treatment Explanation - The following has been discussed with the patient:   RX  ordered/plan of care  Anticipated outcomes  Possible risks and side effects  This patient would benefit from PT intervention to resume normal activities.   Rehab potential is good.    Frequency:  1 X week, once daily  Duration:  for 1 week  Discharge Plan:  Achieve all LTG.  Independent in home treatment program.  Reach maximal therapeutic benefit.    Patient will benefit from continued skilled physical therapy services at a clinic closer to home to address the aforementioned deficits.    Please refer to the daily flowsheet for treatment today, total treatment time and time spent performing 1:1 timed codes.

## 2017-08-29 NOTE — PROGRESS NOTES
Orthopaedic Surgery Clinic Note  Encounter Date: 8/29/2017    DOS: 7/28/17  Procedure: Right knee LRL and anterior chamber release    DOS: 8/11/17  Procedure: EUA and I&D right knee. Preop range of motion 20-70 . Postop range of motion 0-120     Subjective:      History of Present Illness  Matthew Ravi is a 36 year old male patient approximately 2 weeks status post exam under anesthesia with manipulation and evacuation of hematoma. Patient has been working with physical therapy and improving on range of motion both in extension and flexion. Denies any wound healing issues.      A comprehensive 10 point review of systems (constitutional, ENT, cardiac, peripheral vascular, lymphatic, respiratory, GI, , Musculoskeletal, skin, Neurological) was performed and found to be negative except as described in this note.     See intake form completed by patient      Objective:      Physical Exam:  EXAMINATION pertinent findings:   VITAL SIGNS: There were no vitals taken for this visit.  There is no height or weight on file to calculate BMI.  RESP: non labored breathing   RLE: incision c/d/i. ROM 3-115. CMS intact distally. Medial translation 2 quadrants, 1 laterally. Mild effusion. Mild crepitance.    Images:  No new images      Impression     36 year old male s/p LRL 7/28 complicated by post op hematoma and stiffness now s/p EUA and I&D 8/11.      Plan:      Continue PT with activities as tolerated. May wean from brace. New prescription for physical therapy provided. Encouraged to continue range of motion and add strengthening focused to his exercises. RTC 3-4 months with functional testing and bilateral 20  axial views    The patient was seen and evaluated by staff physician, Dr. Goldstein who agrees with the plan.?  ?  LILIYA Wheeler MD  PGY-4 Orthopaedic Surgery    Total Time = 15 min, 50% of which was spent in counseling and coordination of care as documented above.     I have personally examined this patient and have  reviewed the clinical presentation and progress note with the resident.  I agree with the treatment plan as outlined.  The plan was formulated with the resident on the day of the resident dictation.    Shilpi Goldstein            Answers for HPI/ROS submitted by the patient on 8/29/2017   General Symptoms: No  Skin Symptoms: No  HENT Symptoms: No  EYE SYMPTOMS: No  HEART SYMPTOMS: No  LUNG SYMPTOMS: No  INTESTINAL SYMPTOMS: No  URINARY SYMPTOMS: No  REPRODUCTIVE SYMPTOMS: No  SKELETAL SYMPTOMS: Yes  BLOOD SYMPTOMS: No  NERVOUS SYSTEM SYMPTOMS: No  MENTAL HEALTH SYMPTOMS: No  Back pain: No  Muscle aches: Yes  Neck pain: No  Swollen joints: Yes  Joint pain: Yes  Bone pain: Yes  Muscle cramps: No  Muscle weakness: Yes  Joint stiffness: Yes  Bone fracture: No

## 2017-08-29 NOTE — LETTER
8/29/2017       RE: Matthew Ravi  [address] [place] [facility] [address]     Dear Colleague,    Thank you for referring your patient, Matthew Ravi, to the [facility]THOP[facility]ED[facility]IC at [facility]. Please see a copy of my visit note below.    Orthopaedic Surgery Clinic Note  Encounter Date: 8/29/2017    DOS: 7/28/17  Procedure: Right knee LRL and anterior chamber release    DOS: 8/11/17  Procedure: EUA and I&D right knee. Preop range of motion 20-70 . Postop range of motion 0-120     Subjective:      History of Present Illness  Matthew Ravi is a 36 year old male patient approximately 2 weeks status post exam under anesthesia with manipulation and evacuation of hematoma. Patient has been working with physical therapy and improving on range of motion both in extension and flexion. Denies any wound healing issues.      A comprehensive 10 point review of systems (constitutional, ENT, cardiac, peripheral vascular, lymphatic, respiratory, GI, , Musculoskeletal, skin, Neurological) was performed and found to be negative except as described in this note.     See intake form completed by patient      Objective:      Physical Exam:  EXAMINATION pertinent findings:   VITAL SIGNS: There were no vitals taken for this visit.  There is no height or weight on file to calculate BMI.  RESP: non labored breathing   RLE: incision c/d/i. ROM 3-115. CMS intact distally. Medial translation 2 quadrants, 1 laterally. Mild effusion. Mild crepitance.    Images:  No new images      Impression     36 year old male s/p LRL 7/28 complicated by post op hematoma and stiffness now s/p EUA and I&D 8/11.      Plan:      Continue PT with activities as tolerated. May wean from brace. New prescription for physical therapy provided. Encouraged to continue range of motion and add strengthening focused to his exercises. RTC 3-4 months with functional testing and bilateral 20  axial views    The  patient was seen and evaluated by staff physician, Dr. Goldstein who agrees with the plan.?  ?  LILIYA Wheeler MD  PGY-4 Orthopaedic Surgery    Total Time = 15 min, 50% of which was spent in counseling and coordination of care as documented above.     I have personally examined this patient and have reviewed the clinical presentation and progress note with the resident.  I agree with the treatment plan as outlined.  The plan was formulated with the resident on the day of the resident dictation.    Shilpi Goldstein

## 2017-08-29 NOTE — NURSING NOTE
Reason For Visit:   Chief Complaint   Patient presents with     Surgical Followup     s/p right knee surgery 7/28/17 and  8/11/17     Primary: [facility]  Ref. MD: Dr. Rodriguez, [facility]        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  Date of injury: 5/2004  Type of injury: running.      Date of surgery: 7/28/17, OPERATIONS:   1.  Right  knee exam under anesthesia.   2. Diagnostic arthroscopy.   3.  Lateral retinacular lengthening (20mm).   4. Anterior [place] Release    Date of Surgery: 8/11/17, OPERATIONS:   1.  Right  knee exam under anesthesia.   2. Irrigation and debridement of Right knee      Smoker: No      Pain Assessment  Patient Currently in Pain: Yes  0-10 Pain Scale: 6  Primary Pain Location: Knee  Pain Orientation: Right  Pain Descriptors: Aching, Sharp  Alleviating Factors: Ice, Rest  Aggravating Factors: Bending, Other (comment) (physical  therapy)

## 2017-08-29 NOTE — MR AVS SNAPSHOT
After Visit Summary   8/29/2017    Matthew Ravi    MRN: [id number]           Patient Information     Date Of Birth          1980        Visit Information        Provider Department      8/29/2017 11:15 AM Shilpi Goldstein MD [facility]        Today's Diagnoses     Patellofemoral instability with pain, unspecified laterality    -  1       Follow-ups after your visit        Your next 10 appointments already scheduled     Nov 07, 2017 12:30 PM CST   (Arrive by 12:20 PM)   LE Performance Test Initial with Shilpi Dave PT   [facility] ([facility])    [address] [place] [address] [address] [facility] [id number] [phone]            Nov 07, 2017  2:15 PM CST   (Arrive by 2:00 PM)   RETURN KNEE with Shilpi Goldstein MD   [facility] ([facility])    [address] [place] [address]  4[address] [facility] [address] [phone]              Who to contact     Please call your clinic at [phone] to:    Ask questions about your health    Make or cancel appointments    Discuss your medicines    Learn about your test results    Speak to your doctor   If you have compliments or concerns about an experience at your clinic, or if you wish to file a complaint, please contact [facility] Patient Relations at [phone] or email us at [email or web address]         Additional Information About Your Visit        MyChart Information     [employer] gives you secure access to your electronic health record. If you see a primary care provider, you can also send messages to your care team and make appointments. If you have questions, please call your primary care clinic.  If you do not have a primary care provider, please call [phone] and they will assist you.      [employer] is an electronic gateway that provides easy, online access to your medical records.  With [employer], you can request a clinic appointment, read your test results, renew a prescription or communicate with your care team.     To access your existing account, please contact your [facility] or call [phone] for assistance.        Care EveryWhere ID     This is your Care EveryWhere ID. This could be used by other organizations to access your [facility] medical records  [id number]         Blood Pressure from Last 3 Encounters:   08/11/17 (P) 134/88   07/28/17 117/77   04/07/13 139/75    Weight from Last 3 Encounters:   08/11/17 97.1 kg (214 lb)   05/09/17 97.4 kg (214 lb 11.2 oz)   03/08/13 83.9 kg (184 lb 14.4 oz)              Today, you had the following     No orders found for display       Primary Care Provider Office Phone # Fax #    Keon Danny Ac [phone]       [facility] [place] [address]  [place] [address]        Equal Access to Services     MATTHEW [facility] AH: Hadii aad ku hadasho Soomaali, waaxda luqadaha, qaybta kaalmada adeegyada, waxay idiin hayaan adeeg kharash la'sanchez . So [facility] [phone].    ATENCIÓN: Si habla español, tiene a aguiar disposición servicios gratuitos de asistencia lingüística. Llame al [phone].    We comply with applicable federal civil rights laws and [place] laws. We do not discriminate on the basis of race, color, national origin, age, disability sex, sexual orientation or gender identity.            Thank you!     Thank you for choosing [facility] ORTHOP[facility]  for your care. Our goal is always to provide you with excellent care. Hearing back from our patients is one way we can continue to improve our services. Please take a few minutes to complete the written survey that you may receive in the mail after your visit with us. Thank you!             Your Updated Medication List - Protect others around you: Learn how to safely use, store and throw away your medicines at www.disposemymeds.org.           This list is accurate as of: 8/29/17 11:59 PM.  Always use your most recent med list.                   Brand Name Dispense Instructions for use Diagnosis    acetaminophen 325 MG tablet    TYLENOL    100 tablet    Take 2 tablets (650 mg) by mouth every 4 hours as needed for other (mild pain)    Patellofemoral instability with pain, unspecified laterality       ATORVASTATIN CALCIUM PO      Take 10 mg by mouth every morning        camphor-menthol 0.5-0.5 % Lotn    DERMASARRA     Apply topically daily as needed for skin care        FEXOFENADINE HCL PO      Take 60 mg by mouth daily as needed for allergies        fluocinonide 0.05 % cream    LIDEX     Apply topically 2 times daily        fluticasone 50 MCG/ACT spray    FLONASE     Spray 2 sprays into both nostrils daily        HYDROXYZINE PAMOATE PO      Take 25 mg by mouth every 6 hours as needed for itching        meclizine 25 MG Chew      Take 25 mg by mouth 2 times daily as needed for dizziness (1/2 tablet)        oxyCODONE 5 MG IR tablet    ROXICODONE    20 tablet    Take 1-2 tablets (5-10 mg) by mouth every 4 hours as needed for pain or other (Moderate to Severe)    Patellofemoral instability with pain, unspecified laterality       senna-docusate 8.6-50 MG per tablet    SENOKOT-S;PERICOLACE    20 tablet    Take 1-2 tablets by mouth 2 times daily Take while on oral narcotics to prevent or treat constipation.    Patellofemoral instability with pain, unspecified laterality       triamcinolone 0.1 % cream    KENALOG     Apply topically 2 times daily as needed for irritation        UNKNOWN TO PATIENT      BP medication daily

## 2017-09-28 ENCOUNTER — TRANSFERRED RECORDS (OUTPATIENT)
Dept: HEALTH INFORMATION MANAGEMENT | Facility: CLINIC | Age: 37
End: 2017-09-28

## 2017-10-31 DIAGNOSIS — Z98.890 S/P RIGHT KNEE SURGERY: Primary | ICD-10-CM

## 2017-11-06 ENCOUNTER — TRANSFERRED RECORDS (OUTPATIENT)
Dept: HEALTH INFORMATION MANAGEMENT | Facility: CLINIC | Age: 37
End: 2017-11-06

## 2017-11-07 ENCOUNTER — MEDICAL CORRESPONDENCE (OUTPATIENT)
Dept: HEALTH INFORMATION MANAGEMENT | Facility: CLINIC | Age: 37
End: 2017-11-07

## 2017-11-07 ENCOUNTER — THERAPY VISIT (OUTPATIENT)
Dept: PHYSICAL THERAPY | Facility: CLINIC | Age: 37
End: 2017-11-07
Payer: COMMERCIAL

## 2017-11-07 ENCOUNTER — OFFICE VISIT (OUTPATIENT)
Dept: ORTHOPEDICS | Facility: CLINIC | Age: 37
End: 2017-11-07

## 2017-11-07 DIAGNOSIS — Z98.890 S/P KNEE SURGERY: Primary | ICD-10-CM

## 2017-11-07 DIAGNOSIS — M25.369 PATELLOFEMORAL INSTABILITY WITH PAIN, UNSPECIFIED LATERALITY: Primary | ICD-10-CM

## 2017-11-07 DIAGNOSIS — M25.569 PATELLOFEMORAL INSTABILITY WITH PAIN, UNSPECIFIED LATERALITY: Primary | ICD-10-CM

## 2017-11-07 PROCEDURE — 97750 PHYSICAL PERFORMANCE TEST: CPT | Mod: GP | Performed by: PHYSICAL THERAPIST

## 2017-11-07 PROCEDURE — 97112 NEUROMUSCULAR REEDUCATION: CPT | Mod: GP | Performed by: PHYSICAL THERAPIST

## 2017-11-07 ASSESSMENT — ACTIVITIES OF DAILY LIVING (ADL)
GIVING WAY, BUCKLING OR SHIFTING OF KNEE: THE SYMPTOM AFFECTS MY ACTIVITY MODERATELY
SWELLING: I HAVE THE SYMPTOM BUT IT DOES NOT AFFECT MY ACTIVITY
KNEEL ON THE FRONT OF YOUR KNEE: ACTIVITY IS FAIRLY DIFFICULT
SQUAT: ACTIVITY IS VERY DIFFICULT
AS_A_RESULT_OF_YOUR_KNEE_INJURY,_HOW_WOULD_YOU_RATE_YOUR_CURRENT_LEVEL_OF_DAILY_ACTIVITY?: ABNORMAL
WALK: ACTIVITY IS MINIMALLY DIFFICULT
RAW_SCORE: 38
STAND: ACTIVITY IS SOMEWHAT DIFFICULT
WEAKNESS: THE SYMPTOM AFFECTS MY ACTIVITY MODERATELY
KNEE_ACTIVITY_OF_DAILY_LIVING_SUM: 38
RISE FROM A CHAIR: ACTIVITY IS MINIMALLY DIFFICULT
HOW_WOULD_YOU_RATE_THE_CURRENT_FUNCTION_OF_YOUR_KNEE_DURING_YOUR_USUAL_DAILY_ACTIVITIES_ON_A_SCALE_FROM_0_TO_100_WITH_100_BEING_YOUR_LEVEL_OF_KNEE_FUNCTION_PRIOR_TO_YOUR_INJURY_AND_0_BEING_THE_INABILITY_TO_PERFORM_ANY_OF_YOUR_USUAL_DAILY_ACTIVITIES?: 65
KNEE_ACTIVITY_OF_DAILY_LIVING_SCORE: 54.29
HOW_WOULD_YOU_RATE_THE_OVERALL_FUNCTION_OF_YOUR_KNEE_DURING_YOUR_USUAL_DAILY_ACTIVITIES?: ABNORMAL
SIT WITH YOUR KNEE BENT: ACTIVITY IS VERY DIFFICULT
GO UP STAIRS: ACTIVITY IS MINIMALLY DIFFICULT
GO DOWN STAIRS: ACTIVITY IS FAIRLY DIFFICULT
STIFFNESS: THE SYMPTOM AFFECTS MY ACTIVITY MODERATELY
LIMPING: I DO NOT HAVE THE SYMPTOM
PAIN: THE SYMPTOM AFFECTS MY ACTIVITY MODERATELY

## 2017-11-07 ASSESSMENT — ENCOUNTER SYMPTOMS
MUSCLE CRAMPS: 0
DEPRESSION: 1
JOINT SWELLING: 1
DECREASED CONCENTRATION: 1
NECK PAIN: 0
STIFFNESS: 1
PANIC: 0
INSOMNIA: 0
MYALGIAS: 0
ARTHRALGIAS: 1
NERVOUS/ANXIOUS: 1
MUSCLE WEAKNESS: 1
BACK PAIN: 0

## 2017-11-07 NOTE — Clinical Note
11/7/2017       RE: Matthew Ravi  [address] [facility] [address]     Dear Colleague,    Thank you for referring your patient, Matthew Ravi, to the [facility] ORTHOPAEDIC [facility]INIC at [facility]. Please see a copy of my visit note below.     Dictation on: 11/07/2017  4:08 PM by: JOSE MIGUEL MCDERMOTT [[id number]]       Answers for HPI/ROS submitted by the patient on 11/7/2017   General Symptoms: No  Skin Symptoms: No  HENT Symptoms: No  EYE SYMPTOMS: No  HEART SYMPTOMS: No  LUNG SYMPTOMS: No  INTESTINAL SYMPTOMS: No  URINARY SYMPTOMS: No  REPRODUCTIVE SYMPTOMS: No  SKELETAL SYMPTOMS: Yes  BLOOD SYMPTOMS: No  NERVOUS SYSTEM SYMPTOMS: No  MENTAL HEALTH SYMPTOMS: Yes  Back pain: No  Muscle aches: No  Neck pain: No  Swollen joints: Yes  Joint pain: Yes  Bone pain: No  Muscle cramps: No  Muscle weakness: Yes  Joint stiffness: Yes  Bone fracture: No  Nervous or Anxious: Yes  Depression: Yes  Trouble sleeping: No  Trouble thinking or concentrating: Yes  Mood changes: Yes  Panic attacks: No      HISTORY OF PRESENT ILLNESS:  Patient is a pleasant 37-year-old male who is status post a lateral retinacular lengthening and anterior chamber 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  He had near pristine cartilage surfaces in the patellofemoral joint.  He came to see me initially for the potential of having a patellofemoral arthroplasty but given his cartilage health on examination, this was not warranted.      He underwent a lateral retinacular lengthening and anterior chamber release 07/28/2017 which was further complicated by a fairly impressive hemarthrosis.  This was evacuated on 08/11/2017.      Once he had this evacuation, he actually progressed fairly quickly with therapy.      He returns today for further consideration.  [place] and he and his wife drive several hours to see me.  He is approximately an hour from [place], [place] and about  45 minutes from [place].  He does have a therapist locally.  They have been working with some physical therapy, but they have been concerned about how fast they can move him in regards to how much they can work on quad strength.  This is true despite the fact that I have given them protocols and my number and my therapist's numbers to direct them.      Overall, he feels that his daily pain is improved.  He is able to do more than he was able to do previously.  He still has difficulty with bent knee activities.      A functional test was done prior to seeing me.  My therapist felt that his overwhelming problem continues to be marked weakness of his quadriceps muscles and that if he had improved strength, this would improve his functional abilities as well.  He did have an improvement with a medial glide from a level 8 to level 4 pain and a fat pad unloading from a level 8 to a level 3 pain when a fat pad unloading was added.      He was taught how to do this taping.  He was also shown how to do more core activities to work more aggressively on strengthening both quads and core      PHYSICAL EXAMINATION:  Reveals a benign appearing knee.  Incisions are healed.  Range of motion is mild hyperextension to 140 degrees of flexion comparable to the other side.  Mild quad atrophy is noted with a 1 cm difference in quad girth.  Range of motion is satisfactory without crepitus.  Passive patella mobility is 1+ quadrant medial, 1+ quadrant lateral mobility, neutral medial patellar tilt test.      His functional test shows that he has 62% side-to-side difference in the retro step, 80% in the single leg squat; however, he cannot do anything when it is projected to fatigue.  His score also shows 63 and 83 on side plank and prone planks.      ASSESSMENT:  I had a zac discussion with Matthew in regards to his ability to strengthen.  He feels that he could be more motivated and could put more energy into physical therapy, but this has  been going on since .  He is the father of 4 children.  He feels that he has made progress but the progress is slow.      He has recommitted himself to doing more C.O.R.E.  He has been given a link to our exercise programs.  He was given a copy of our functional test with more directions to his therapist back home.      In addition to the above, I believe that he would be appropriate for a Kneehab brace.  This is due to his measured quad atrophy, and his inability to improve his quad strength with conventional physical therapy.  The nerve supply to the muscle is intact.  He would benefit from a large area of treatment for electrical stimulation which is obtained through a conductive garment.      If the Kneehab does not seem to jump start his quad, we could consider looking into blood restrictive therapy.  I am sure that this would be difficult in his location and he likely would need to travel to [place] or [place] to receive this.      Because of this is a toll on the family to come see me, we have left it with followin.  We will obtain the Kneehab sleeve.   2.  He will follow up with me by email or through [employer] in 3 months' time.  If he continues to have deficits in quad strength as measured by some form of a functional test done by his home locality, we would consider blood restrictive therapy.  It would take some coordination to see what is the best site for him to obtain this at but that I believe would be an appropriate next step.  All questions were answered.      This is a postop visit.      Again, thank you for allowing me to participate in the care of your patient.      Sincerely,    Shilpi Goldstein MD

## 2017-11-07 NOTE — MR AVS SNAPSHOT
After Visit Summary   11/7/2017    Matthew Ravi    MRN: [id number]           Patient Information     Date Of Birth          1980        Visit Information        Provider Department      11/7/2017 2:15 PM Shilpi Goldstein MD [facility]        Today's Diagnoses     S/P knee surgery    -  1       Follow-ups after your visit        Who to contact     Please call your clinic at [phone] to:    Ask questions about your health    Make or cancel appointments    Discuss your medicines    Learn about your test results    Speak to your doctor   If you have compliments or concerns about an experience at your clinic, or if you wish to file a complaint, please contact [facility] Patient Relations at [phone] or email us at [email or web address]         Additional Information About Your Visit        MyChart Information     [employer] gives you secure access to your electronic health record. If you see a primary care provider, you can also send messages to your care team and make appointments. If you have questions, please call your primary care clinic.  If you do not have a primary care provider, please call [phone] and they will assist you.      [employer] is an electronic gateway that provides easy, online access to your medical records. With [employer], you can request a clinic appointment, read your test results, renew a prescription or communicate with your care team.     To access your existing account, please contact your [facility] or call [phone] for assistance.        Care EveryWhere ID     This is your Care EveryWhere ID. This could be used by other organizations to access your [place] medical records  [id number]         Blood Pressure from Last 3 Encounters:   No data found for BP    Weight from Last 3 Encounters:   No data found for Wt              Today, you had the following     No orders  found for display       Primary Care Provider Office Phone # Fax #    Keon Ac [phone]       [facility] [address]  [place] [address]        Equal Access to Services     ROSALINDAMATTHEW RYAN : Hadii guilherme ku melissa Soyaneth, waaxda luqadaha, qaybta kaalmada adeegyada, allen ha laMyriamsanchez angeles. So [facility] [phone].    ATENCIÓN: Si habla español, tiene a aguiar disposición servicios gratuitos de asistencia lingüística. [facility] [phone].    We comply with applicable federal civil rights laws and [place] laws. We do not discriminate on the basis of race, color, national origin, age, disability, sex, sexual orientation, or gender identity.            Thank you!     Thank you for choosing [facility]  for your care. Our goal is always to provide you with excellent care. Hearing back from our patients is one way we can continue to improve our services. Please take a few minutes to complete the written survey that you may receive in the mail after your visit with us. Thank you!             Your Updated Medication List - Protect others around you: Learn how to safely use, store and throw away your medicines at www.disposemymeds.org.          This list is accurate as of: 11/7/17 11:59 PM.  Always use your most recent med list.                   Brand Name Dispense Instructions for use Diagnosis    acetaminophen 325 MG tablet    TYLENOL    100 tablet    Take 2 tablets (650 mg) by mouth every 4 hours as needed for other (mild pain)    Patellofemoral instability with pain, unspecified laterality       ATORVASTATIN CALCIUM PO      Take 10 mg by mouth every morning        camphor-menthol 0.5-0.5 % Lotn    DERMASARRA     Apply topically daily as needed for skin care        FEXOFENADINE HCL PO      Take 60 mg by mouth daily as needed for allergies        fluocinonide 0.05 % cream    LIDEX     Apply topically 2 times daily        fluticasone 50 MCG/ACT spray     FLONASE     Spray 2 sprays into both nostrils daily        HYDROXYZINE PAMOATE PO      Take 25 mg by mouth every 6 hours as needed for itching        meclizine 25 MG Chew      Take 25 mg by mouth 2 times daily as needed for dizziness (1/2 tablet)        oxyCODONE IR 5 MG tablet    ROXICODONE    20 tablet    Take 1-2 tablets (5-10 mg) by mouth every 4 hours as needed for pain or other (Moderate to Severe)    Patellofemoral instability with pain, unspecified laterality       senna-docusate 8.6-50 MG per tablet    SENOKOT-S;PERICOLACE    20 tablet    Take 1-2 tablets by mouth 2 times daily Take while on oral narcotics to prevent or treat constipation.    Patellofemoral instability with pain, unspecified laterality       triamcinolone 0.1 % cream    KENALOG     Apply topically 2 times daily as needed for irritation        UNKNOWN TO PATIENT      BP medication daily

## 2017-11-07 NOTE — PROGRESS NOTES
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  He had near pristine cartilage surfaces in the patellofemoral joint.  He came to see me initially for the potential of having a patellofemoral arthroplasty but given his cartilage health on examination, this was not warranted.      He underwent a lateral retinacular lengthening and anterior chamber release 07/28/2017 which was further complicated by a fairly impressive hemarthrosis.  This was evacuated on 08/11/2017.      Once he had this evacuation, he actually progressed fairly quickly with therapy.      He returns today for further consideration.  [place] and he and his wife drive several hours to see me.  He is approximately an hour from [place], [place] and about 45 minutes from [place].  He does have a therapist locally.  They have been working with some physical therapy, but they have been concerned about how fast they can move him in regards to how much they can work on quad strength.  This is true despite the fact that I have given them protocols and my number and my therapist's numbers to direct them.      Overall, he feels that his daily pain is improved.  He is able to do more than he was able to do previously.  He still has difficulty with bent knee activities.      A functional test was done prior to seeing me.  My therapist felt that his overwhelming problem continues to be marked weakness of his quadriceps muscles and that if he had improved strength, this would improve his functional abilities as well.  He did have an improvement with a medial glide from a level 8 to level 4 pain and a fat pad unloading from a level 8 to a level 3 pain when a fat pad unloading was added.      He was taught how to do this taping.  He was also shown how to do more core activities to work more  aggressively on strengthening both quads and core      PHYSICAL EXAMINATION:  Reveals a benign appearing knee.  Incisions are healed.  Range of motion is mild hyperextension to 140 degrees of flexion comparable to the other side.  Mild quad atrophy is noted with a 1 cm difference in quad girth.  Range of motion is satisfactory without crepitus.  Passive patella mobility is 1+ quadrant medial, 1+ quadrant lateral mobility, neutral medial patellar tilt test.      His functional test shows that he has 62% side-to-side difference in the retro step, 80% in the single leg squat; however, he cannot do anything when it is projected to fatigue.  His score also shows 63 and 83 on side plank and prone planks.      ASSESSMENT:  I had a zac discussion with Matthew in regards to his ability to strengthen.  He feels that he could be more motivated and could put more energy into physical therapy, but this has been going on since 2007.  He is the father of 4 children.  He feels that he has made progress but the progress is slow.      He has recommitted himself to doing more C.O.R.E.  He has been given a link to our exercise programs.  He was given a copy of our functional test with more directions to his therapist back home.      In addition to the above, I believe that he would be appropriate for a Kneehab brace.  This is due to his measured quad atrophy, and his inability to improve his quad strength with conventional physical therapy.  The nerve supply to the muscle is intact.  He would benefit from a large area of treatment for electrical stimulation which is obtained through a conductive garment.      If the Kneehab does not seem to jump start his quad, we could consider looking into blood restrictive therapy.  I am sure that this would be difficult in his location and he likely would need to travel to [place] or [place] to receive this.      Because of this is a toll on the family to come see me, we have left it with  followin.  We will obtain the Kneehab sleeve.   2.  He will follow up with me by email or through [employer] in 3 months' time.  If he continues to have deficits in quad strength as measured by some form of a functional test done by his home locality, we would consider blood restrictive therapy.  It would take some coordination to see what is the best site for him to obtain this at but that I believe would be an appropriate next step.  All questions were answered.      This is a postop visit.       Answers for HPI/ROS submitted by the patient on 2017   General Symptoms: No  Skin Symptoms: No  HENT Symptoms: No  EYE SYMPTOMS: No  HEART SYMPTOMS: No  LUNG SYMPTOMS: No  INTESTINAL SYMPTOMS: No  URINARY SYMPTOMS: No  REPRODUCTIVE SYMPTOMS: No  SKELETAL SYMPTOMS: Yes  BLOOD SYMPTOMS: No  NERVOUS SYSTEM SYMPTOMS: No  MENTAL HEALTH SYMPTOMS: Yes  Back pain: No  Muscle aches: No  Neck pain: No  Swollen joints: Yes  Joint pain: Yes  Bone pain: No  Muscle cramps: No  Muscle weakness: Yes  Joint stiffness: Yes  Bone fracture: No  Nervous or Anxious: Yes  Depression: Yes  Trouble sleeping: No  Trouble thinking or concentrating: Yes  Mood changes: Yes  Panic attacks: No

## 2017-11-07 NOTE — LETTER
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  He had near pristine cartilage surfaces in the patellofemoral joint.  He came to see me initially for the potential of having a patellofemoral arthroplasty but given his cartilage health on examination, this was not warranted.      He underwent a lateral retinacular lengthening and anterior chamber release 07/28/2017 which was further complicated by a fairly impressive hemarthrosis.  This was evacuated on 08/11/2017.      Once he had this evacuation, he actually progressed fairly quickly with therapy.      He returns today for further consideration.  [place] and he and his wife drive several hours to see me.  He is approximately an hour from [place], [place] and about 45 minutes from [place].  He does have a therapist locally.  They have been working with some physical therapy, but they have been concerned about how fast they can move him in regards to how much they can work on quad strength.  This is true despite the fact that I have given them protocols and my number and my therapist's numbers to direct them.      Overall, he feels that his daily pain is improved.  He is able to do more than he was able to do previously.  He still has difficulty with bent knee activities.      A functional test was done prior to seeing me.  My therapist felt that his overwhelming problem continues to be marked weakness of his quadriceps muscles and that if he had improved strength, this would improve his functional abilities as well.  He did have an improvement with a medial glide from a level 8 to level 4 pain and a fat pad unloading from a level 8 to a level 3 pain when a fat pad unloading was added.      He was taught how to do  this taping.  He was also shown how to do more core activities to work more aggressively on strengthening both quads and core      PHYSICAL EXAMINATION:  Reveals a benign appearing knee.  Incisions are healed.  Range of motion is mild hyperextension to 140 degrees of flexion comparable to the other side.  Mild quad atrophy is noted with a 1 cm difference in quad girth.  Range of motion is satisfactory without crepitus.  Passive patella mobility is 1+ quadrant medial, 1+ quadrant lateral mobility, neutral medial patellar tilt test.      His functional test shows that he has 62% side-to-side difference in the retro step, 80% in the single leg squat; however, he cannot do anything when it is projected to fatigue.  His score also shows 63 and 83 on side plank and prone planks.      ASSESSMENT:  I had a zac discussion with Matthew in regards to his ability to strengthen.  He feels that he could be more motivated and could put more energy into physical therapy, but this has been going on since 2007.  He is the father of 4 children.  He feels that he has made progress but the progress is slow.      He has recommitted himself to doing more C.O.R.E.  He has been given a link to our exercise programs.  He was given a copy of our functional test with more directions to his therapist back home.      In addition to the above, I believe that he would be appropriate for a Kneehab brace.  This is due to his measured quad atrophy, and his inability to improve his quad strength with conventional physical therapy.  The nerve supply to the muscle is intact.  He would benefit from a large area of treatment for electrical stimulation which is obtained through a conductive garment.      If the Kneehab does not seem to jump start his quad, we could consider looking into blood restrictive therapy.  I am sure that this would be difficult in his location and he likely would need to travel to [place] or [place] to receive this.      Because  of this is a toll on the family to come see me, we have left it with followin.  We will obtain the Kneehab sleeve.   2.  He will follow up with me by email or through MyChart in 3 months' time.  If he continues to have deficits in quad strength as measured by some form of a functional test done by his home locality, we would consider blood restrictive therapy.  It would take some coordination to see what is the best site for him to obtain this at but that I believe would be an appropriate next step.  All questions were answered.      This is a postop visit.         Shilpi Goldstein MD

## 2017-11-07 NOTE — NURSING NOTE
Reason For Visit:   Chief Complaint   Patient presents with     Surgical Followup     s/p right knee surgery 7/28/17 and 8/11/17     Primary: [facility]  [place] [place] 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  Date of injury: 5/2004  Type of injury: running injury.  Date of surgery: 7/28/17, OPERATIONS:   1.  Right  knee exam under anesthesia.   2. Diagnostic arthroscopy.   3.  Lateral retinacular lengthening (20mm).   4. Anterior [place] Release     Date of Surgery: 8/11/17, OPERATIONS:   1.  Right  knee exam under anesthesia.   2. Irrigation and debridement of Right knee    Smoker: No      Pain Assessment  Patient Currently in Pain: Yes  0-10 Pain Scale: 6  Primary Pain Location: Knee  Pain Orientation: Right  Pain Descriptors: Aching  Alleviating Factors: Other (comment) (nothing)  Aggravating Factors: Other (comment) (PT)

## 2017-11-07 NOTE — MR AVS SNAPSHOT
After Visit Summary   11/7/2017    Matthew Ravi    MRN: [id number]           Patient Information     Date Of Birth          1980        Visit Information        Provider Department      11/7/2017 12:30 PM Shilpi Dave PT [facility]        Today's Diagnoses     Patellofemoral instability with pain, unspecified laterality    -  1       Follow-ups after your visit        Who to contact     If you have questions or need follow up information about today's clinic visit or your schedule please contact [facility] directly at [phone].  Normal or non-critical lab and imaging results will be communicated to you by [employer], letter or phone within 4 business days after the clinic has received the results. If you do not hear from us within 7 days, please contact the clinic through [employer] or phone. If you have a critical or abnormal lab result, we will notify you by phone as soon as possible.  Submit refill requests through [employer] or call your pharmacy and they will forward the refill request to us. Please allow 3 business days for your refill to be completed.          Additional Information About Your Visit        MyChart Information     [employer] gives you secure access to your electronic health record. If you see a primary care provider, you can also send messages to your care team and make appointments. If you have questions, please call your primary care clinic.  If you do not have a primary care provider, please call [phone] and they will assist you.        Care EveryWhere ID     This is your Care EveryWhere ID. This could be used by other organizations to access your [place] medical records  [id number]         Blood Pressure from Last 3 Encounters:   08/11/17 (P) 134/88   07/28/17 117/77   04/07/13 139/75    Weight from Last 3 Encounters:   08/11/17 97.1 kg (214 lb)   05/09/17 97.4 kg (214 lb 11.2 oz)   03/08/13 83.9 kg (184 lb 14.4 oz)               We Performed the Following     NEUROMUSCULAR RE-EDUCATION     PHYSICAL PERFORMANCE TEST        Primary Care Provider Office Phone # Fax #    Keon Ac [phone]       [facility] [place] [address]  [place] [address]        Equal Access to Services     ADEN CMEDDIE : Hadii aad ku hadyevgeniyo Somariahali, waaxda luqadaha, qaybta kaalmada adeegyada, waxay idiin hayprestonn adethomas ha ladionnashalini karthik. So [facility] [place] [phone].    ATENCIÓN: Si habla español, tiene a aguiar disposición servicios gratuitos de asistencia lingüística. Llame al [phone].    We comply with applicable federal civil rights laws and [place] laws. We do not discriminate on the basis of race, color, national origin, age, disability, sex, sexual orientation, or gender identity.            Thank you!     Thank you for choosing [facility]  for your care. Our goal is always to provide you with excellent care. Hearing back from our patients is one way we can continue to improve our services. Please take a few minutes to complete the written survey that you may receive in the mail after your visit with us. Thank you!             Your Updated Medication List - Protect others around you: Learn how to safely use, store and throw away your medicines at www.disposemymeds.org.          This list is accurate as of: 11/7/17  3:56 PM.  Always use your most recent med list.                   Brand Name Dispense Instructions for use Diagnosis    acetaminophen 325 MG tablet    TYLENOL    100 tablet    Take 2 tablets (650 mg) by mouth every 4 hours as needed for other (mild pain)    Patellofemoral instability with pain, unspecified laterality       ATORVASTATIN CALCIUM PO      Take 10 mg by mouth every morning        camphor-menthol 0.5-0.5 % Lotn    DERMASARRA     Apply topically daily as needed for skin care        FEXOFENADINE HCL PO      Take 60 mg by mouth daily as needed for allergies        fluocinonide 0.05 %  cream    LIDEX     Apply topically 2 times daily        fluticasone 50 MCG/ACT spray    FLONASE     Spray 2 sprays into both nostrils daily        HYDROXYZINE PAMOATE PO      Take 25 mg by mouth every 6 hours as needed for itching        meclizine 25 MG Chew      Take 25 mg by mouth 2 times daily as needed for dizziness (1/2 tablet)        oxyCODONE IR 5 MG tablet    ROXICODONE    20 tablet    Take 1-2 tablets (5-10 mg) by mouth every 4 hours as needed for pain or other (Moderate to Severe)    Patellofemoral instability with pain, unspecified laterality       senna-docusate 8.6-50 MG per tablet    SENOKOT-S;PERICOLACE    20 tablet    Take 1-2 tablets by mouth 2 times daily Take while on oral narcotics to prevent or treat constipation.    Patellofemoral instability with pain, unspecified laterality       triamcinolone 0.1 % cream    KENALOG     Apply topically 2 times daily as needed for irritation        UNKNOWN TO PATIENT      BP medication daily

## 2017-11-07 NOTE — PROGRESS NOTES
Subjective:    HPI                  Lower Extremity Physical Performance Testing    Surgery/Injury: s/p anterior chamber debridement, LRL; s/p I&D Involved Extremity: right   Date of Surgery/Injury: 7/28/17; 8/11/17    Surgeon/MD: Dr. Goldstein  Therapist performing test: Moriah Dave DPT    Primary Treating Therapist: [facility]    Patient subjective symptom/function report: Patient states that his kneecap continues to be painful.  He is attending PT 1x/week, but admits to not doing any exercise as a home program.  Painful and apprehensive with descending steps.     Taping Trial:  Jonathan taping: medial glide decreased pain from 8/10 to 4/10 with squat; medial glide + fat pad unloading decreased pain from 8/10 to 3/10 with squat    LSI% =Limb Symmetry Index (score comparison between involved/uninvolved extremity)    Anthropomorphic Measures      Range of Motion Jt. Line Circum. Measurement 15 cm Prox Circum. Measurement   Uninvolved 5-0-140 degrees 41 cm 55.5 cm   Involved 5-0-139 degrees 42 cm 55.5 cm   Difference  1 cm 0 cm     Evaluation chosen: Return to Function (Level I): Balance and Muscle Strength. Allowed at or after 2-4 months post-op ACL     Return to Function (Level I): Balance, Muscle Strength   Testing Protocol: Recorded values = best effort of 3 attempts (after 2 practice attempts).    Single Leg Stand and Reach Anterior/Medial Anterior/Lateral   Uninvolved Extremity 63 cm 57 cm   Involved Extremity 84 cm   49 cm   LSI% 133% 85 %     Single Leg Balance Max = 60 seconds   Uninvolved Extremity 55 seconds   Involved Extremity 60 seconds   LSI% 109%     Single Leg Squat    Uninvolved Extremity 75 degrees   Involved Extremity 60 degrees   LSI% 80%     Retro Step    Uninvolved Extremity 8 in.   Involved Extremity 5 in.     LSI% 62%     Return to Function (Level I): Core Stability. Allowed at or after 2-4 months post-op ACL     Return to Function (Level I): Core Stability   Perceived  Exertion Ratin to 5 point scale, (0) Very Easy << >> (5) Maximal Exertion.  1 verbal cuing episode for alignment correction allowed before testing terminated.  Progress patient from basic to advanced versions of core poses as strength/endurance/control improves.    Prone Plank Hold: Pose: advanced X/60 Seconds Perceived Exertion   Hold Time 38/60 seconds 5   LSI% 63%      Side Plank Hold: Pose: advanced X/60 Seconds Percent Return Perceived Exertion   Uninvolved Side Down 43/60 seconds 71% 3   Involved Side Down 36/60 seconds 60% 4   LSI% 83%       Single Leg Bridge Reps (Tempo 60 BPM) # of Reps to Failure   Uninvolved Extremity 4 - cramped x 2   Involved Extremity 19   LSI% 475%     Return to Fitness (Level II): Muscle Endurance, Power. Allowed at or after 4-6 months post-op ACL     Return to Fitness (Level II): Muscle Endurance, Power   Level II Functional Prerequisites:   1) All Level I tests ?85% of uninvolved side or maximal value, and  2) Bilateral squats ?90  knee flexion x 20 reps with good trunk, L/E alignment control.    Perceived Exertion Ratin to 5 point scale, (0) Very Easy << >> (5) Maximal Exertion.  2 verbal cuing episodes allowed for alignment correction before testing terminated.  Only reps completed with good alignment counted toward total reps.    Star Excursion Balance Test Posteromedial Reach Anterior Reach Posterolateral Reach   Uninvolved Extremity 75 cm 39 cm 63 cm   Involved Extremity 72 cm 37 cm 67 cm   LSI% 96% 94% 106%     Patient unable to perform single leg squat endurance testing on either leg.    Assessment/Plan:  Patient is four months s/p LRL, anterior chamber debridement complicated by hematoma formation and subsequent irrigation and debridement.  His motion has improved significantly.  He continues to have an effusion.  He demonstrates difficulty with DL and SL squatting activities, but improves with pain levels with Castillo taping.    Exercises Instructed per Test  Findings:  1) Planks - prone and side  2) DL and SL squat  3) Castillo taping    Objective:    System    Physical Exam    General     ROS    Assessment/Plan:      ASSESSMENT/PLAN  Updated problem list and treatment plan: Diagnosis 1:  S/p LRL, anterior chamber release    Pain -  hot/cold therapy, manual therapy, splint/taping/bracing/orthotics, self management, education and home program  Decreased strength - therapeutic exercise and therapeutic activities  Impaired balance - neuro re-education and therapeutic activities  Decreased proprioception - neuro re-education and therapeutic activities  Inflammation - cold therapy  Edema - vasopneumatics  Impaired muscle performance - neuro re-education  Decreased function - therapeutic activities  STG/LTGs have been met:  Yes (See Goal flow sheet completed today.)  Progress toward STG/LTGs have been made:  Yes (See Goal flow sheet completed today.)  Assessment of Progress: The patient's condition is improving.  Self Management Plans:  Patient has been instructed in a home treatment program.  I have re-evaluated this patient and find that the nature, scope, duration and intensity of the therapy is appropriate for the medical condition of the patient.  Matthew continues to require the following intervention to meet STG and LT's:  PT    Recommendations:  This patient would benefit from continued therapy.     He will continue to be seen with his PT close to home and will return to this center as needed for performance testing as instructed by Dr. Goldsetin.        Please refer to the daily flowsheet for treatment today, total treatment time and time spent performing 1:1 timed codes.

## 2020-03-01 ENCOUNTER — HEALTH MAINTENANCE LETTER (OUTPATIENT)
Age: 40
End: 2020-03-01

## 2020-12-14 ENCOUNTER — HEALTH MAINTENANCE LETTER (OUTPATIENT)
Age: 40
End: 2020-12-14

## 2021-03-10 NOTE — MR AVS SNAPSHOT
After Visit Summary   8/29/2017    Matthew Ravi    MRN: [id number]           Patient Information     Date Of Birth          1980        Visit Information        Provider Department      8/29/2017 10:40 AM Shilpi Dave PT [facility]        Today's Diagnoses     Right knee pain    -  1       Follow-ups after your visit        Who to contact     If you have questions or need follow up information about today's clinic visit or your schedule please contact [facility] directly at [phone].  Normal or non-critical lab and imaging results will be communicated to you by VoodooVoxhart, letter or phone within 4 business days after the clinic has received the results. If you do not hear from us within 7 days, please contact the clinic through LoungeUpt or phone. If you have a critical or abnormal lab result, we will notify you by phone as soon as possible.  Submit refill requests through Daric or call your pharmacy and they will forward the refill request to us. Please allow 3 business days for your refill to be completed.          Additional Information About Your Visit        MyChart Information     Daric gives you secure access to your electronic health record. If you see a primary care provider, you can also send messages to your care team and make appointments. If you have questions, please call your primary care clinic.  If you do not have a primary care provider, please call [phone] and they will assist you.        Care EveryWhere ID     This is your Care EveryWhere ID. This could be used by other organizations to access your [facility] medical records  [id number]         Blood Pressure from Last 3 Encounters:   08/11/17 (P) 134/88   07/28/17 117/77   04/07/13 139/75    Weight from Last 3 Encounters:   08/11/17 97.1 kg (214 lb)   05/09/17 97.4 kg (214 lb 11.2 oz)   03/08/13 83.9 kg (184 lb 14.4 oz)              We Performed the Following      No clinic visit scheduled. Can I call him to schedule?   HC PT EVAL, LOW COMPLEXITY     FOSTER INITIAL EVAL REPORT     MANUAL THER TECH,1+REGIONS,EA 15 MIN     NEUROMUSCULAR RE-EDUCATION     THERAPEUTIC EXERCISES        Primary Care Provider Office Phone # Fax #    Keon Ac [phone]       [facility] [place] [address]  [place] [address]        Equal Access to Services     [facility]: Hadii aad ku hadasho Soomaali, waaxda luqadaha, qaybta kaalmada adeegyada, waxay idiin hayaan adeeg kharash la'aan . So [facility] [phone].    ATENCIÓN: Si habla espemelyn, tiene a aguiar disposición servicios gratuitos de asistencia lingüística. Llame al [phone].    We comply with applicable federal civil rights laws and [place] laws. We do not discriminate on the basis of race, color, national origin, age, disability sex, sexual orientation or gender identity.            Thank you!     Thank you for choosing [facility]  for your care. Our goal is always to provide you with excellent care. Hearing back from our patients is one way we can continue to improve our services. Please take a few minutes to complete the written survey that you may receive in the mail after your visit with us. Thank you!             Your Updated Medication List - Protect others around you: Learn how to safely use, store and throw away your medicines at www.disposemymeds.org.          This list is accurate as of: 8/29/17  4:26 PM.  Always use your most recent med list.                   Brand Name Dispense Instructions for use Diagnosis    acetaminophen 325 MG tablet    TYLENOL    100 tablet    Take 2 tablets (650 mg) by mouth every 4 hours as needed for other (mild pain)    Patellofemoral instability with pain, unspecified laterality       ATORVASTATIN CALCIUM PO      Take 10 mg by mouth every morning        camphor-menthol 0.5-0.5 % Lotn    DERMASARRA     Apply topically daily as needed for skin care        FEXOFENADINE HCL PO      Take 60 mg by mouth daily  as needed for allergies        fluocinonide 0.05 % cream    LIDEX     Apply topically 2 times daily        fluticasone 50 MCG/ACT spray    FLONASE     Spray 2 sprays into both nostrils daily        HYDROXYZINE PAMOATE PO      Take 25 mg by mouth every 6 hours as needed for itching        meclizine 25 MG Chew      Take 25 mg by mouth 2 times daily as needed for dizziness (1/2 tablet)        oxyCODONE 5 MG IR tablet    ROXICODONE    20 tablet    Take 1-2 tablets (5-10 mg) by mouth every 4 hours as needed for pain or other (Moderate to Severe)    Patellofemoral instability with pain, unspecified laterality       senna-docusate 8.6-50 MG per tablet    SENOKOT-S;PERICOLACE    20 tablet    Take 1-2 tablets by mouth 2 times daily Take while on oral narcotics to prevent or treat constipation.    Patellofemoral instability with pain, unspecified laterality       triamcinolone 0.1 % cream    KENALOG     Apply topically 2 times daily as needed for irritation        UNKNOWN TO PATIENT      BP medication daily

## 2021-04-17 ENCOUNTER — HEALTH MAINTENANCE LETTER (OUTPATIENT)
Age: 41
End: 2021-04-17

## 2021-10-02 ENCOUNTER — HEALTH MAINTENANCE LETTER (OUTPATIENT)
Age: 41
End: 2021-10-02

## 2022-05-08 ENCOUNTER — HEALTH MAINTENANCE LETTER (OUTPATIENT)
Age: 42
End: 2022-05-08

## 2023-01-14 ENCOUNTER — HEALTH MAINTENANCE LETTER (OUTPATIENT)
Age: 43
End: 2023-01-14

## 2023-06-02 ENCOUNTER — HEALTH MAINTENANCE LETTER (OUTPATIENT)
Age: 43
End: 2023-06-02

## (undated) DEVICE — DRSG STERI STRIP 1/2X4" R1547

## (undated) DEVICE — LINEN GOWN XLG 5407

## (undated) DEVICE — BNDG ESMARK 6" STERILE LF 820-3612

## (undated) DEVICE — Device

## (undated) DEVICE — GLOVE GAMMEX NEOPRENE ULTRA SZ 6.5 LF 8513

## (undated) DEVICE — SOL NACL 0.9% IRRIG 1000ML BOTTLE 2F7124

## (undated) DEVICE — SU VICRYL 2-0 CT-2 27" UND J269H

## (undated) DEVICE — SOL WATER IRRIG 1000ML BOTTLE 2F7114

## (undated) DEVICE — SUCTION MANIFOLD NEPTUNE 2 SYS 4 PORT 0702-020-000

## (undated) DEVICE — TUBING SYSTEM ARTHREX PATIENT REDEUCE AR-6421

## (undated) DEVICE — CAST PADDING 4" STERILE 9044S

## (undated) DEVICE — ESU PENCIL W/HOLSTER

## (undated) DEVICE — ESU GROUND PAD ADULT W/CORD E7507

## (undated) DEVICE — GLOVE PROTEXIS POWDER FREE SMT 6.5  2D72PT65X

## (undated) DEVICE — SU VICRYL 1 CT-2 27" J335H

## (undated) DEVICE — PACK ARTHROSCOPY CUSTOM ASC

## (undated) DEVICE — SU MONOCRYL 3-0 PS-1 27" Y936H

## (undated) DEVICE — LINEN ORTHO PACK 5446

## (undated) DEVICE — SOL NACL 0.9% IRRIG 3000ML BAG 2B7477

## (undated) DEVICE — GLOVE GAMMEX NEOPRENE ULTRA SZ 7 LF 8514

## (undated) DEVICE — PACK LOWER EXTREMITY CUSTOM ASC

## (undated) DEVICE — PREP SKIN SCRUB TRAY 4461A

## (undated) DEVICE — TUBING SYSTEM ARTHREX PUMP REDEUCE AR-6411

## (undated) DEVICE — DRAPE STOCKINETTE IMPERVIOUS 12" 1587

## (undated) RX ORDER — KETOROLAC TROMETHAMINE 30 MG/ML
INJECTION, SOLUTION INTRAMUSCULAR; INTRAVENOUS
Status: DISPENSED
Start: 2017-08-11

## (undated) RX ORDER — PROPOFOL 10 MG/ML
INJECTION, EMULSION INTRAVENOUS
Status: DISPENSED
Start: 2017-07-28

## (undated) RX ORDER — GABAPENTIN 300 MG/1
CAPSULE ORAL
Status: DISPENSED
Start: 2017-08-11

## (undated) RX ORDER — BUPIVACAINE HYDROCHLORIDE AND EPINEPHRINE 2.5; 5 MG/ML; UG/ML
INJECTION, SOLUTION EPIDURAL; INFILTRATION; INTRACAUDAL; PERINEURAL
Status: DISPENSED
Start: 2017-07-28

## (undated) RX ORDER — KETOROLAC TROMETHAMINE 30 MG/ML
INJECTION, SOLUTION INTRAMUSCULAR; INTRAVENOUS
Status: DISPENSED
Start: 2017-07-28

## (undated) RX ORDER — DEXAMETHASONE SODIUM PHOSPHATE 4 MG/ML
INJECTION, SOLUTION INTRA-ARTICULAR; INTRALESIONAL; INTRAMUSCULAR; INTRAVENOUS; SOFT TISSUE
Status: DISPENSED
Start: 2017-07-28

## (undated) RX ORDER — FENTANYL CITRATE 50 UG/ML
INJECTION, SOLUTION INTRAMUSCULAR; INTRAVENOUS
Status: DISPENSED
Start: 2017-08-11

## (undated) RX ORDER — ACETAMINOPHEN 325 MG/1
TABLET ORAL
Status: DISPENSED
Start: 2017-07-28

## (undated) RX ORDER — GABAPENTIN 300 MG/1
CAPSULE ORAL
Status: DISPENSED
Start: 2017-07-28

## (undated) RX ORDER — PROPOFOL 10 MG/ML
INJECTION, EMULSION INTRAVENOUS
Status: DISPENSED
Start: 2017-08-11

## (undated) RX ORDER — ONDANSETRON 2 MG/ML
INJECTION INTRAMUSCULAR; INTRAVENOUS
Status: DISPENSED
Start: 2017-08-11

## (undated) RX ORDER — ONDANSETRON 2 MG/ML
INJECTION INTRAMUSCULAR; INTRAVENOUS
Status: DISPENSED
Start: 2017-07-28

## (undated) RX ORDER — GLYCOPYRROLATE 0.2 MG/ML
INJECTION INTRAMUSCULAR; INTRAVENOUS
Status: DISPENSED
Start: 2017-07-28

## (undated) RX ORDER — FENTANYL CITRATE 50 UG/ML
INJECTION, SOLUTION INTRAMUSCULAR; INTRAVENOUS
Status: DISPENSED
Start: 2017-07-28

## (undated) RX ORDER — DEXAMETHASONE SODIUM PHOSPHATE 4 MG/ML
INJECTION, SOLUTION INTRA-ARTICULAR; INTRALESIONAL; INTRAMUSCULAR; INTRAVENOUS; SOFT TISSUE
Status: DISPENSED
Start: 2017-08-11

## (undated) RX ORDER — GLYCOPYRROLATE 0.2 MG/ML
INJECTION INTRAMUSCULAR; INTRAVENOUS
Status: DISPENSED
Start: 2017-08-11

## (undated) RX ORDER — ACETAMINOPHEN 325 MG/1
TABLET ORAL
Status: DISPENSED
Start: 2017-08-11

## (undated) RX ORDER — OXYCODONE HYDROCHLORIDE 10 MG/1
TABLET ORAL
Status: DISPENSED
Start: 2017-07-28